# Patient Record
Sex: FEMALE | Race: WHITE | Employment: UNEMPLOYED | URBAN - NONMETROPOLITAN AREA
[De-identification: names, ages, dates, MRNs, and addresses within clinical notes are randomized per-mention and may not be internally consistent; named-entity substitution may affect disease eponyms.]

---

## 2017-12-12 ENCOUNTER — TELEPHONE (OUTPATIENT)
Dept: NEUROLOGY | Age: 32
End: 2017-12-12

## 2020-11-17 ENCOUNTER — OFFICE VISIT (OUTPATIENT)
Dept: INTERNAL MEDICINE CLINIC | Age: 35
End: 2020-11-17
Payer: COMMERCIAL

## 2020-11-17 VITALS
TEMPERATURE: 97.5 F | HEIGHT: 67 IN | BODY MASS INDEX: 33.12 KG/M2 | WEIGHT: 211 LBS | HEART RATE: 82 BPM | SYSTOLIC BLOOD PRESSURE: 135 MMHG | DIASTOLIC BLOOD PRESSURE: 73 MMHG

## 2020-11-17 PROCEDURE — 4004F PT TOBACCO SCREEN RCVD TLK: CPT | Performed by: INTERNAL MEDICINE

## 2020-11-17 PROCEDURE — G8417 CALC BMI ABV UP PARAM F/U: HCPCS | Performed by: INTERNAL MEDICINE

## 2020-11-17 PROCEDURE — G8484 FLU IMMUNIZE NO ADMIN: HCPCS | Performed by: INTERNAL MEDICINE

## 2020-11-17 PROCEDURE — 99204 OFFICE O/P NEW MOD 45 MIN: CPT | Performed by: INTERNAL MEDICINE

## 2020-11-17 PROCEDURE — G8427 DOCREV CUR MEDS BY ELIG CLIN: HCPCS | Performed by: INTERNAL MEDICINE

## 2020-11-17 PROCEDURE — 80305 DRUG TEST PRSMV DIR OPT OBS: CPT | Performed by: INTERNAL MEDICINE

## 2020-11-17 RX ORDER — NALTREXONE HYDROCHLORIDE 50 MG/1
TABLET, FILM COATED ORAL
COMMUNITY
Start: 2020-06-18

## 2020-11-17 RX ORDER — HYDROXYZINE PAMOATE 25 MG/1
CAPSULE ORAL
COMMUNITY
Start: 2020-08-12

## 2020-11-17 RX ORDER — TRAZODONE HYDROCHLORIDE 50 MG/1
TABLET ORAL
COMMUNITY
Start: 2020-08-12

## 2020-11-17 RX ORDER — BUPROPION HYDROCHLORIDE 150 MG/1
TABLET ORAL
COMMUNITY
Start: 2020-08-12

## 2020-11-17 RX ORDER — BUPRENORPHINE AND NALOXONE 4; 1 MG/1; MG/1
1 FILM, SOLUBLE BUCCAL; SUBLINGUAL ONCE
Status: COMPLETED | OUTPATIENT
Start: 2020-11-17 | End: 2020-11-17

## 2020-11-17 RX ADMIN — BUPRENORPHINE AND NALOXONE 1 FILM: 4; 1 FILM, SOLUBLE BUCCAL; SUBLINGUAL at 16:18

## 2020-11-17 NOTE — PROGRESS NOTES
Verbal order per Dr. Rene Mehta for urine drug screen. Negative for all drugs. Verified results with [facility] Patient., LPN. Dr. Rene Mehta ordered Suboxone 4 mg film BID for patient. Verified dose with patient. Patient was given Suboxone 4 mg film qty 1, in office. Patient was sent home with Suboxone 4 mg film BID qty 2 and will be seen back in the office on 11/18/20.

## 2020-11-17 NOTE — PROGRESS NOTES
MEDICATION ASSISTED TREATMENT ENCOUNTER    HISTORY OF PRESENT ILLNESS  Patient presents for evaluation of opioid use and would like to be placed on medication assisted treatment  Patient is referred by a friend  Patient has had problems using heroin  Patient started using [id number] years ago  She started buying pain pills off the street at age 12 then went to heroin  Patient uses it intravenous  Other drugs used: THC  Suboxone programs in the past about 2 years ago [facility] Horn is a put her on Suboxone but her  said she could not take it  Vivitrol in the past through Olu Horn she was on this for a few months a couple years ago  Last use of heroin 2 weeks ago  She used Percocets about a week ago  Patient would typically use more than a gram a day when she was using heavy  Ever used Suboxone off the street no    Past Medical History:   Diagnosis Date    Hepatitis C        Past Surgical History:   Procedure Laterality Date    TUBAL LIGATION  2016       PAST PSYCHIATRIC HISTORY: ADHD      No Known Allergies    Current Outpatient Medications   Medication Sig Dispense Refill    buPROPion (WELLBUTRIN XL) 150 MG extended release tablet buPROPion Wellbutrin  MG Oral Tablet Extended Release 24 Hour 08/12/2020 Provider: 08-  [facility] ([id number])      hydrOXYzine (VISTARIL) 25 MG capsule hydrOXYzine Vistaril 25 MG Oral Capsule 08/12/2020 Provider: 08-  [facility] 87 ([id number])      traZODone (DESYREL) 50 MG tablet traZODone traZODone HCl 50 MG Oral Tablet 08/12/2020 Provider: 08-  [facility] 87 ([id number])      buprenorphine-naloxone (SUBOXONE) 4-1 MG FILM SL film Place 1 Film under the tongue 2 times daily for 5 days.  10 Film 0    naltrexone (DEPADE) 50 MG tablet Naltrexone Naltrexone HCl 50 MG Oral Tablet 08/12/2020 Provider: 08-  [facility] 87 ([id number])       No current facility-administered medications for this visit. 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     Legal issues [employer] term of 2 years multiple alf terms     Tobacco: yes, cigarettes     Alcohol occasional                 ROS     General: Patient denies fevers, chills ,weight changes, sweats     Psych: No depression, anxiety, suicidal ideation or attempts     Endocrine: No thyroid issues,no neck pain, no galactorrhea, no weight changes     Pulmonary: No shortness of breath, orthopnea, PND     Cardiac: No chest pain,syncope, no history of cardiac issues     GI: No trouble with bowels, no abdominal pain     : No dysuria, nocturia, urgency, frequency     MS: Patient denies bone or joint aches, no myalgias     Neuro: Patient denies headaches, seizures, tremors     Skin: No skin lesions, rashes    PHYSICAL EXAM     Blood pressure 135/73, pulse 82, temperature 97.5 °F (36.4 °C), height 5' 7\" (1.702 m), weight 211 lb (95.7 kg).              General: Patient resting comfortably in no acute distress     Mental Status Examination:  Level of consciousness:  within normal limits and awake  Appearance:  well-appearing, in chair, good grooming and good hygiene  Behavior/Motor:  {Normal  Attitude toward examiner:  cooperative and attentive  Speech:  spontaneous and normal volume  Mood: normal  Affect:  appropriate  Thought processes:  linear  Thought content:  Denies homicidal ideations  Suicidal Ideation:  denies suicidal ideation  Delusions:  no evidence of delusions  Perceptual Disturbance:  denies any perceptual disturbance  Cognition:  oriented to person, place, and time    Insight : good  Judgment: good  Medication Side Effects:none       Eyes: Pupils are normal         Skin: No rashes, lesions or abnormalities noted        URINE DRUG SCREEN TODAY:  Recent Labs 11/18/20  1520   ALCOHOL NEG   LABAMPH NEG   LABBARB NEG   LABBENZ NEG   BUPRENUR POS   COCAIMETSCRU NEG   FENTSCRUR NEG   GABAPENTIN N/A   MDMA NEG   METAMPU NEG   LABMETH NEG   OPIATESCREENURINE NEG   OXTCOSU NEG   PHENCYCLIDINESCREENURINE NEG   PROPOXYPHENE N/A   SPICEUR NEG   THCSCREENUR POS   TRAMADOLUR NEG   TRICYUR N/A           Diagnosis Orders   1. Severe opioid use disorder (HCC)  POCT Rapid Drug Screen    buprenorphine-naloxone (SUBOXONE) 4-1 MG SL film 1 Film   2. Encounter for monitoring Suboxone maintenance therapy           PLAN:  Provider provided education on Medication Assisted Treatment options, including: Suboxone, Sublocade, Methadone, and Naltrexone/Vivitrol  Allowed opportunity to respond to questions regarding the treatment options. Patient voices that their treatment preference is suboxone. I feel that this patient is an appropriate candidate for this treatment option. Education given on the importance of combining Medication Assisted Treatment with comprehensive treatment, including: individual counseling, treatment groups, community support groups, and psychiatry as applicable. Patient will meet with  to review clinic counseling expectations and to be linked to appropriate services. Provider reviewed medication contract with patient. Patient is agreeable to the program expectations. Both patient and provider signed the medication contract. Patient instructed to go to [facility] to watch a video and learn about [employer]. I told patient [employer] is an opioid antagonist that reverses respiratory depression caused by opioids. Pharmacy will give patient or family member [employer] and explain how to use in an emergency.   I reviewed the [place] Automated Rx Reporting System report     There does not appear to be any discrepancies or overprescribing of controlled substances    Patient was given 4 mg of Suboxone here and monitored for 30

## 2020-11-18 ENCOUNTER — OFFICE VISIT (OUTPATIENT)
Dept: INTERNAL MEDICINE CLINIC | Age: 35
End: 2020-11-18
Payer: COMMERCIAL

## 2020-11-18 VITALS
WEIGHT: 210 LBS | HEART RATE: 85 BPM | BODY MASS INDEX: 32.96 KG/M2 | SYSTOLIC BLOOD PRESSURE: 123 MMHG | HEIGHT: 67 IN | TEMPERATURE: 97.3 F | DIASTOLIC BLOOD PRESSURE: 67 MMHG

## 2020-11-18 LAB
ALCOHOL URINE: ABNORMAL
AMPHETAMINE SCREEN, URINE: ABNORMAL
BARBITURATE SCREEN, URINE: ABNORMAL
BENZODIAZEPINE SCREEN, URINE: ABNORMAL
BUPRENORPHINE URINE: ABNORMAL
COCAINE METABOLITE SCREEN URINE: ABNORMAL
FENTANYL SCREEN, URINE: ABNORMAL
GABAPENTIN SCREEN, URINE: ABNORMAL
MDMA URINE: ABNORMAL
METHADONE SCREEN, URINE: ABNORMAL
METHAMPHETAMINE, URINE: ABNORMAL
OPIATE SCREEN URINE: ABNORMAL
OXYCODONE SCREEN URINE: ABNORMAL
PHENCYCLIDINE SCREEN URINE: ABNORMAL
PROPOXYPHENE SCREEN, URINE: ABNORMAL
SYNTHETIC CANNABINOIDS(K2) SCREEN, URINE: ABNORMAL
THC SCREEN, URINE: ABNORMAL
TRAMADOL SCREEN URINE: ABNORMAL
TRICYCLIC ANTIDEPRESSANTS, UR: ABNORMAL

## 2020-11-18 PROCEDURE — 80305 DRUG TEST PRSMV DIR OPT OBS: CPT | Performed by: INTERNAL MEDICINE

## 2020-11-18 PROCEDURE — 4004F PT TOBACCO SCREEN RCVD TLK: CPT | Performed by: INTERNAL MEDICINE

## 2020-11-18 PROCEDURE — G8417 CALC BMI ABV UP PARAM F/U: HCPCS | Performed by: INTERNAL MEDICINE

## 2020-11-18 PROCEDURE — 99213 OFFICE O/P EST LOW 20 MIN: CPT | Performed by: INTERNAL MEDICINE

## 2020-11-18 PROCEDURE — G8427 DOCREV CUR MEDS BY ELIG CLIN: HCPCS | Performed by: INTERNAL MEDICINE

## 2020-11-18 PROCEDURE — G8484 FLU IMMUNIZE NO ADMIN: HCPCS | Performed by: INTERNAL MEDICINE

## 2020-11-18 RX ORDER — BUPRENORPHINE AND NALOXONE 4; 1 MG/1; MG/1
1 FILM, SOLUBLE BUCCAL; SUBLINGUAL 2 TIMES DAILY
Qty: 10 FILM | Refills: 0 | Status: SHIPPED | OUTPATIENT
Start: 2020-11-18 | End: 2020-11-23 | Stop reason: SDUPTHER

## 2020-11-18 NOTE — PROGRESS NOTES
MEDICATION ASSISTED TREATMENT ENCOUNTER    HISTORY OF PRESENT ILLNESS  Patient presents for evaluation of opioid use and would like to be placed on medication assisted treatment  Patient is referred by a friend  I saw her here yesterday for the first time (11/17)  We did a Suboxone induction here with 4 mg  I gave her 2 days 4 mg films to go home with  She says she feels fine today    Patient has had problems using heroin  Patient started using [id number] years ago  She started buying pain pills off the street at age 12 then went to heroin  Patient uses it intravenous  Other drugs used: THC          ROS     General: Patient is feeling well  Patient is not experiencing  withdrawal symptoms ,no urges or cravings  Patient is not having any side effects from the buprenorphine      PHYSICAL EXAM     Blood pressure 123/67, pulse 85, temperature 97.3 °F (36.3 °C), height 5' 7\" (1.702 m), weight 210 lb (95.3 kg).              General: Patient resting comfortably in no acute distress     Mental Status Examination:  Level of consciousness:  within normal limits and awake  Appearance:  well-appearing, in chair, good grooming and good hygiene  Behavior/Motor:  {Normal  Attitude toward examiner:  cooperative and attentive  Speech:  spontaneous and normal volume  Mood: normal  Affect:  appropriate  Thought processes:  linear  Thought content:  Denies homicidal ideations  Suicidal Ideation:  denies suicidal ideation  Delusions:  no evidence of delusions  Perceptual Disturbance:  denies any perceptual disturbance  Cognition:  oriented to person, place, and time    Insight : good  Judgment: good  Medication Side Effects:none       Eyes: Pupils are normal         Skin: No rashes, lesions or abnormalities noted        URINE DRUG SCREEN TODAY:  No results for input(s): ALCOHOL, LABAMPH, LABBARB, LABBENZ, BUPRENUR, COCAIMETSCRU, FENTSCRUR, GABAPENTIN, MDMA, METAMPU, LABMETH, OPIATESCREENURINE, OXTCOSU, PHENCYCLIDINESCREENURINE, PROPOXYPHENE, SPICEUR, THCSCREENUR, TRAMADOLUR, TRICYUR in the last 72 hours. Diagnosis Orders   1. Severe opioid use disorder ([facility]  POCT Rapid Drug Screen    YSABEL - Estefani Jones MD, Gastroenterology, [facility] FENG CRAFT [address].VIERTEL    DISCONTINUED: buprenorphine-naloxone (SUBOXONE) 4-1 MG FILM SL film   2. Encounter for monitoring Suboxone maintenance therapy     3. Chronic hepatitis C without hepatic coma ([facility]     4.  Polysubstance abuse ([facility]           PLAN:  Plan to continue Suboxone  She has hepatitis C  We will order baseline labs for treatment  Follow-up here in 5 days  Need to set up counseling and meetings etc.

## 2020-11-18 NOTE — PROGRESS NOTES
Verbal order per Dr. Beryl Ace for urine drug screen. Positive for BUP THC. Verified results with Marshall Urban RN. Dr. Beryl Ace ordered Suboxone 4mg film BID  for patient. Verified dose with patient. Patient was sent home with 5 day script of Subxoone 4mg film daily BID and will be seen back in the office 11/23/20. Ordered Hep C labs.

## 2020-11-23 ENCOUNTER — OFFICE VISIT (OUTPATIENT)
Dept: INTERNAL MEDICINE CLINIC | Age: 35
End: 2020-11-23
Payer: COMMERCIAL

## 2020-11-23 VITALS
HEIGHT: 67 IN | SYSTOLIC BLOOD PRESSURE: 131 MMHG | BODY MASS INDEX: 32.96 KG/M2 | DIASTOLIC BLOOD PRESSURE: 68 MMHG | HEART RATE: 86 BPM | WEIGHT: 210 LBS | TEMPERATURE: 97.4 F

## 2020-11-23 PROCEDURE — 99213 OFFICE O/P EST LOW 20 MIN: CPT | Performed by: INTERNAL MEDICINE

## 2020-11-23 PROCEDURE — G8484 FLU IMMUNIZE NO ADMIN: HCPCS | Performed by: INTERNAL MEDICINE

## 2020-11-23 PROCEDURE — G8427 DOCREV CUR MEDS BY ELIG CLIN: HCPCS | Performed by: INTERNAL MEDICINE

## 2020-11-23 PROCEDURE — G8417 CALC BMI ABV UP PARAM F/U: HCPCS | Performed by: INTERNAL MEDICINE

## 2020-11-23 PROCEDURE — 80305 DRUG TEST PRSMV DIR OPT OBS: CPT | Performed by: INTERNAL MEDICINE

## 2020-11-23 PROCEDURE — 4004F PT TOBACCO SCREEN RCVD TLK: CPT | Performed by: INTERNAL MEDICINE

## 2020-11-23 RX ORDER — BUPRENORPHINE AND NALOXONE 4; 1 MG/1; MG/1
1 FILM, SOLUBLE BUCCAL; SUBLINGUAL 2 TIMES DAILY
Qty: 16 FILM | Refills: 0 | Status: SHIPPED | OUTPATIENT
Start: 2020-11-23 | End: 2020-12-01

## 2020-11-23 NOTE — PROGRESS NOTES
MEDICATION ASSISTED TREATMENT ENCOUNTER    HISTORY OF PRESENT ILLNESS  Patient presents for evaluation of opioid use and would like to be placed on medication assisted treatment  Patient is referred by a friend  I saw her here  for the first time (11/17)  I then saw her 11/18  We did a Suboxone induction here with 4 mg  I gave her 2 days 4 mg films to go home with  She says she feels fine today    Patient has had problems using heroin  Patient started using [id number] years ago  She started buying pain pills off the street at age 12 then went to heroin  Patient uses it intravenous  Other drugs used: THC          ROS     General: Patient is feeling well  Patient is not experiencing  withdrawal symptoms ,no urges or cravings  Patient is not having any side effects from the buprenorphine      PHYSICAL EXAM     Blood pressure 131/68, pulse 86, temperature 97.4 °F (36.3 °C), height 5' 7\" (1.702 m), weight 210 lb (95.3 kg).              General: Patient resting comfortably in no acute distress     Mental Status Examination:  Level of consciousness:  within normal limits and awake  Appearance:  well-appearing, in chair, good grooming and good hygiene  Behavior/Motor:  {Normal  Attitude toward examiner:  cooperative and attentive  Speech:  spontaneous and normal volume  Mood: normal  Affect:  appropriate  Thought processes:  linear  Thought content:  Denies homicidal ideations  Suicidal Ideation:  denies suicidal ideation  Delusions:  no evidence of delusions  Perceptual Disturbance:  denies any perceptual disturbance  Cognition:  oriented to person, place, and time    Insight : good  Judgment: good  Medication Side Effects:none       Eyes: Pupils are normal         Skin: No rashes, lesions or abnormalities noted        URINE DRUG SCREEN TODAY:  Recent Labs     11/23/20  1607   ALCOHOL NEG   LABAMPH NEG   LABBARB NEG   LABBENZ NEG   [id number] NEG   FENTSCRUR NEG   GABAPENTIN N/A   MDMA NEG   METAMPU NEG   LABMETH NEG   OPIATESCREENURINE NEG   OXTCOSU NEG   PHENCYCLIDINESCREENURINE NEG   PROPOXYPHENE N/A   SPICEUR NEG   THCSCREENUR POS   TRAMADOLUR NEG   TRICYUR N/A           Diagnosis Orders   1. Severe opioid use disorder (HCC)  POCT Rapid Drug Screen    buprenorphine-naloxone (SUBOXONE) 4-1 MG FILM SL film   2. Hepatitis C virus carrier state ([facility]     3. Encounter for monitoring Suboxone maintenance therapy     4.  Polysubstance abuse ([facility]           PLAN:  She is going through a break-up with a man after 4 years  It is stressful  She had some thoughts about using but she did not  Follow-up here 8 days  I reviewed the [place] Automated Rx Reporting System report     There does not appear to be any discrepancies or overprescribing of controlled substances

## 2020-12-01 ENCOUNTER — VIRTUAL VISIT (OUTPATIENT)
Dept: INTERNAL MEDICINE CLINIC | Age: 35
End: 2020-12-01
Payer: COMMERCIAL

## 2020-12-01 PROCEDURE — G8484 FLU IMMUNIZE NO ADMIN: HCPCS | Performed by: INTERNAL MEDICINE

## 2020-12-01 PROCEDURE — 99213 OFFICE O/P EST LOW 20 MIN: CPT | Performed by: INTERNAL MEDICINE

## 2020-12-01 PROCEDURE — G8417 CALC BMI ABV UP PARAM F/U: HCPCS | Performed by: INTERNAL MEDICINE

## 2020-12-01 PROCEDURE — G8427 DOCREV CUR MEDS BY ELIG CLIN: HCPCS | Performed by: INTERNAL MEDICINE

## 2020-12-01 PROCEDURE — 4004F PT TOBACCO SCREEN RCVD TLK: CPT | Performed by: INTERNAL MEDICINE

## 2020-12-01 RX ORDER — BUPRENORPHINE AND NALOXONE 4; 1 MG/1; MG/1
1 FILM, SOLUBLE BUCCAL; SUBLINGUAL 2 TIMES DAILY
Qty: 16 FILM | Refills: 0 | Status: CANCELLED | OUTPATIENT
Start: 2020-12-01 | End: 2020-12-09

## 2020-12-01 RX ORDER — BUPRENORPHINE AND NALOXONE 12; 3 MG/1; MG/1
1 FILM, SOLUBLE BUCCAL; SUBLINGUAL DAILY
Qty: 7 FILM | Refills: 0 | Status: SHIPPED | OUTPATIENT
Start: 2020-12-01 | End: 2020-12-11 | Stop reason: SDUPTHER

## 2020-12-01 NOTE — PROGRESS NOTES
2020    TELEHEALTH EVALUATION -- Audio/Visual (During JQHEE-47 public health emergency)    HPI:  A video conference was done with the patient  Patient was at home, I was in the office  There was no  one else  present during the conference    Leticia Smith (:  1985) has requested an audio/video evaluation for the following concern(s):    HPI  I saw her here  for the first time ()  I then saw her   We did a Suboxone induction here with 4 mg  I gave her 2 days 4 mg films to go home with  She says she feels fine today    Patient has had problems using heroin  Patient started using [id number] years ago  She started buying pain pills off the street at age 12 then went to heroin  Patient uses it intravenous  Other drugs used: THC    ROS-patient is still having some cravings not every day but she is having some  She has not acted on them    Prior to Visit Medications    Medication Sig Taking? Authorizing Provider   buprenorphine-naloxone (SUBOXONE) 4-1 MG FILM SL film Place 1 Film under the tongue 2 times daily for 8 days.  Yes Yael Alejandra MD   buPROPion (WELLBUTRIN XL) 150 MG extended release tablet buPROPion Wellbutrin  MG Oral Tablet Extended Release 24 Hour 2020 Provider: 2020  [place] [address] ([id number]) Yes Historical Provider, MD   hydrOXYzine (VISTARIL) 25 MG capsule hydrOXYzine Vistaril 25 MG Oral Capsule 2020 Provider: 2020  [place] [address] ([id number]) Yes Historical Provider, MD   traZODone (DESYREL) 50 MG tablet traZODone traZODone HCl 50 MG Oral Tablet 2020 Provider: 2020  [place] [address] ([id number]) Yes Historical Provider, MD   naltrexone (DEPADE) 50 MG tablet Naltrexone Naltrexone HCl 50 MG Oral Tablet 2020 Provider: 2020  [place] [address] ([id number]) Yes Historical Provider, MD       Social History     Tobacco Use    Smoking status: Current Every Day Smoker     Packs/day: 0.50 Types: Cigarettes    Smokeless tobacco: Former User   Substance Use Topics    Alcohol use: No    Drug use: Not Currently     Types: Marijuana, IV, Opiates      Comment: last used perc 30 1 week ago 11/10/20. last used heroin 2 weeks ago 11/3/20            PHYSICAL EXAMINATION:  [ INSTRUCTIONS:  \"[x]\" Indicates a positive item  \"[]\" Indicates a negative item  -- DELETE ALL ITEMS NOT EXAMINED]  No vitals done    Constitutional: [x] Appears well-developed and well-nourished [x] No apparent distress      [] Abnormal-   Mental status  [x] Alert and awake  [x] Oriented to person/place/time [x]Able to follow commands      Eyes:  EOM    [x]  Normal  [] Abnormal-  Sclera  []  Normal  [] Abnormal -         Discharge []  None visible  [] Abnormal -  Pupils normal           Psychiatric:       [x] Normal Affect [] No Hallucinations        [] Abnormal-        Diagnosis Orders   1. Severe opioid use disorder (HCC)  buprenorphine-naloxone (SUBOXONE) 4-1 MG FILM SL film         Terese Olszewski is a 29 y.o. female being evaluated by a Virtual Visit (video visit) encounter to address concerns as mentioned above. A caregiver was present when appropriate. Due to this being a TeleHealth encounter (During [facility]- public health emergency), evaluation of the following organ systems was limited: Vitals/Constitutional/EENT/Resp/CV/GI//MS/Neuro/Skin/Heme-Lymph-Imm. Pursuant to the emergency declaration under the [facility], [address] [place] [address] and the [employer] Act, this Virtual Visit was conducted with patient's (and/or legal guardian's) consent, to reduce the patient's risk of exposure to COVID-19 and provide necessary medical care. The patient (and/or legal guardian) has also been advised to contact this office for worsening conditions or problems, and seek emergency medical treatment and/or call 911 if deemed necessary.      Services were provided through a video synchronous discussion virtually to substitute for in-person clinic visit. --Bryan Mcconnell MD on 12/1/2020 at 4:11 PM    An electronic signature was used to authenticate this note.

## 2020-12-11 ENCOUNTER — OFFICE VISIT (OUTPATIENT)
Dept: INTERNAL MEDICINE CLINIC | Age: 35
End: 2020-12-11
Payer: COMMERCIAL

## 2020-12-11 VITALS
RESPIRATION RATE: 12 BRPM | HEART RATE: 87 BPM | SYSTOLIC BLOOD PRESSURE: 136 MMHG | TEMPERATURE: 97.4 F | HEIGHT: 67 IN | BODY MASS INDEX: 32.08 KG/M2 | WEIGHT: 204.4 LBS | DIASTOLIC BLOOD PRESSURE: 82 MMHG

## 2020-12-11 PROCEDURE — G8417 CALC BMI ABV UP PARAM F/U: HCPCS | Performed by: INTERNAL MEDICINE

## 2020-12-11 PROCEDURE — 80305 DRUG TEST PRSMV DIR OPT OBS: CPT | Performed by: INTERNAL MEDICINE

## 2020-12-11 PROCEDURE — G8484 FLU IMMUNIZE NO ADMIN: HCPCS | Performed by: INTERNAL MEDICINE

## 2020-12-11 PROCEDURE — 4004F PT TOBACCO SCREEN RCVD TLK: CPT | Performed by: INTERNAL MEDICINE

## 2020-12-11 PROCEDURE — G8427 DOCREV CUR MEDS BY ELIG CLIN: HCPCS | Performed by: INTERNAL MEDICINE

## 2020-12-11 PROCEDURE — 99213 OFFICE O/P EST LOW 20 MIN: CPT | Performed by: INTERNAL MEDICINE

## 2020-12-11 RX ORDER — BUPRENORPHINE AND NALOXONE 12; 3 MG/1; MG/1
0.5 FILM, SOLUBLE BUCCAL; SUBLINGUAL 2 TIMES DAILY
Qty: 3 FILM | Refills: 0 | Status: SHIPPED | OUTPATIENT
Start: 2020-12-11 | End: 2020-12-14 | Stop reason: SDUPTHER

## 2020-12-11 ASSESSMENT — PATIENT HEALTH QUESTIONNAIRE - PHQ9
SUM OF ALL RESPONSES TO PHQ QUESTIONS 1-9: 0
2. FEELING DOWN, DEPRESSED OR HOPELESS: 0
SUM OF ALL RESPONSES TO PHQ9 QUESTIONS 1 & 2: 0
1. LITTLE INTEREST OR PLEASURE IN DOING THINGS: 0
SUM OF ALL RESPONSES TO PHQ QUESTIONS 1-9: 0
SUM OF ALL RESPONSES TO PHQ QUESTIONS 1-9: 0

## 2020-12-11 NOTE — PROGRESS NOTES
tablet traZODone traZODone HCl 50 MG Oral Tablet 08/12/2020 Provider: 08-  [facility] ([id number]) Yes Historical Provider, MD   naltrexone (DEPADE) 50 MG tablet Naltrexone Naltrexone HCl 50 MG Oral Tablet 08/12/2020 Provider: 08-  [facility] 87 ([id number]) Yes Historical Provider, MD        Social History     Tobacco Use    Smoking status: Current Every Day Smoker     Packs/day: 0.50     Types: Cigarettes    Smokeless tobacco: Former User   Substance Use Topics    Alcohol use: No        Vitals:    12/11/20 1100   BP: 136/82   Site: Right Upper Arm   Position: Sitting   Cuff Size: Large Adult   Pulse: 87   Resp: 12   Temp: 97.4 °F (36.3 °C)   TempSrc: Temporal   Weight: 204 lb 6.4 oz (92.7 kg)   Height: 5' 7\" (1.702 m)     Estimated body mass index is 32.01 kg/m² as calculated from the following:    Height as of this encounter: 5' 7\" (1.702 m). Weight as of this encounter: 204 lb 6.4 oz (92.7 kg). Physical Exam     She is alert and oriented. Urine is +ve for Amphetamine, Bup, Methamphetamine,   THC     ASSESSMENT/PLAN:    Chronic opiate disorder  - with last intake of meth and others   Yesterday, she was advised to stay clean so that she can   Continue the suboxone, 12 mg film  1/2 BID x 4 days   And advised her to closely follow up with counseling and   Our office on Monday 12/14. Suboxone  Scripts sent to the pharmacy today. Return in about 3 days (around 12/14/2020). and follow up   With Dr. Parris Domínguez at this office. An electronic signature was used to authenticate this note.     --Yissel De La Rosa MD on 12/11/2020 at 6:28 PM

## 2020-12-14 ENCOUNTER — VIRTUAL VISIT (OUTPATIENT)
Dept: INTERNAL MEDICINE CLINIC | Age: 35
End: 2020-12-14
Payer: COMMERCIAL

## 2020-12-14 PROCEDURE — G8484 FLU IMMUNIZE NO ADMIN: HCPCS | Performed by: INTERNAL MEDICINE

## 2020-12-14 PROCEDURE — 4004F PT TOBACCO SCREEN RCVD TLK: CPT | Performed by: INTERNAL MEDICINE

## 2020-12-14 PROCEDURE — G8417 CALC BMI ABV UP PARAM F/U: HCPCS | Performed by: INTERNAL MEDICINE

## 2020-12-14 PROCEDURE — 99213 OFFICE O/P EST LOW 20 MIN: CPT | Performed by: INTERNAL MEDICINE

## 2020-12-14 PROCEDURE — G8428 CUR MEDS NOT DOCUMENT: HCPCS | Performed by: INTERNAL MEDICINE

## 2020-12-14 RX ORDER — BUPRENORPHINE AND NALOXONE 12; 3 MG/1; MG/1
1 FILM, SOLUBLE BUCCAL; SUBLINGUAL DAILY
Qty: 4 FILM | Refills: 0 | Status: SHIPPED | OUTPATIENT
Start: 2020-12-14 | End: 2020-12-17 | Stop reason: SDUPTHER

## 2020-12-14 NOTE — PROGRESS NOTES
Heron Morris is a 29 y.o. female evaluated via telephone on 12/14/2020. Consent:  She and/or health care decision maker is aware that that she may receive a bill for this telephone service, depending on her insurance coverage, and has provided verbal consent to proceed: Yes    We tried a video visit today but it would not work so we made  It a phone visit  Documentation:  I last saw her 11/23  She saw Dr. Leeanne Burt 12/11  Patient is referred by a friend  I saw her here  for the first time (11/17)    We did a Suboxone induction here with 4 mg  I gave her 2 days 4 mg films to go home with  She says she feels fine today    Patient has had problems using heroin  Patient started using [id number] years ago  She started buying pain pills off the street at age 12 then went to heroin  Patient uses it intravenous  Other drugs used: THC    I affirm this is a Patient Initiated Episode with a Patient who has not had a related appointment within my department in the past 7 days or scheduled within the next 24 hours. Diagnosis Orders   1. Severe opioid use disorder (HCC)  buprenorphine-naloxone (SUBOXONE) 12-3 MG sublingual film   2. Encounter for monitoring Suboxone maintenance therapy     3. Polysubstance abuse ([facility]     4.  Chronic hepatitis C without hepatic coma ([facility]     Patient was supposed to come in today however she had to take her child to the doctor  I will give her enough medicine to last until Thursday  She will follow-up here in the office at that time    Patient identification was verified at the start of the visit: Yes    Total Time: minutes: 5-10 minutes    Note: not billable if this call serves to triage the patient into an appointment for the relevant concern      Jane Marvin

## 2020-12-17 ENCOUNTER — VIRTUAL VISIT (OUTPATIENT)
Dept: INTERNAL MEDICINE CLINIC | Age: 35
End: 2020-12-17
Payer: COMMERCIAL

## 2020-12-17 PROCEDURE — 4004F PT TOBACCO SCREEN RCVD TLK: CPT | Performed by: INTERNAL MEDICINE

## 2020-12-17 PROCEDURE — G8417 CALC BMI ABV UP PARAM F/U: HCPCS | Performed by: INTERNAL MEDICINE

## 2020-12-17 PROCEDURE — 99213 OFFICE O/P EST LOW 20 MIN: CPT | Performed by: INTERNAL MEDICINE

## 2020-12-17 PROCEDURE — G8428 CUR MEDS NOT DOCUMENT: HCPCS | Performed by: INTERNAL MEDICINE

## 2020-12-17 PROCEDURE — G8484 FLU IMMUNIZE NO ADMIN: HCPCS | Performed by: INTERNAL MEDICINE

## 2020-12-17 RX ORDER — BUPRENORPHINE AND NALOXONE 12; 3 MG/1; MG/1
1 FILM, SOLUBLE BUCCAL; SUBLINGUAL DAILY
Qty: 12 FILM | Refills: 0 | Status: SHIPPED | OUTPATIENT
Start: 2020-12-17 | End: 2020-12-29

## 2020-12-17 NOTE — PROGRESS NOTES
2020    TELEHEALTH EVALUATION -- Audio/Visual (During [place]-15 public health emergency)    HPI:  A video conference was done with the patient  Patient was at home, I was in the office  There was no  one else  present during the conference    Rosemary Meade (:  1985) has requested an audio/video evaluation for the following concern(s):    HPI  I saw her here  for the first time ()  I then saw her   We did a virtual visit   We did a Suboxone induction here with 4 mg  I gave her 2 days 4 mg films to go home with  She is under a lot of stress boyfriend left her    Patient has had problems using heroin  Patient started using [id number] years ago  She started buying pain pills off the street at age 12 then went to heroin  Patient uses it intravenous  Other drugs used: THC  ROS-Patient is feeling well  Patient is not experiencing  withdrawal symptoms ,no urges or cravings  Patient is not having any side effects from the buprenorphine      Prior to Visit Medications    Medication Sig Taking? Authorizing Provider   buprenorphine-naloxone (SUBOXONE) 12-3 MG sublingual film Place 1 Film under the tongue daily for 12 days.  Yes Shiloh Trinidad MD   buPROPion (WELLBUTRIN XL) 150 MG extended release tablet buPROPion Wellbutrin  MG Oral Tablet Extended Release 24 Hour 2020 Provider: 2020  [facility] 87 ([id number])  Historical Provider, MD   hydrOXYzine (VISTARIL) 25 MG capsule hydrOXYzine Vistaril 25 MG Oral Capsule 2020 Provider: 2020  [facility] 87 ([id number])  Historical Provider, MD   traZODone (DESYREL) 50 MG tablet traZODone traZODone HCl 50 MG Oral Tablet 2020 Provider: 2020  [facility] 87 ([id number])  Historical Provider, MD   naltrexone (DEPADE) 50 MG tablet Naltrexone Naltrexone HCl 50 MG Oral Tablet 2020 Provider: 2020  [facility] 87 ([id number])  Historical Provider, MD Social History     Tobacco Use    Smoking status: Current Every Day Smoker     Packs/day: 0.50     Types: Cigarettes    Smokeless tobacco: Former User   Substance Use Topics    Alcohol use: No    Drug use: Not Currently     Types: Marijuana, IV, Opiates      Comment: last used perc 30 1 week ago 11/10/20. last used heroin 2 weeks ago 11/3/20            PHYSICAL EXAMINATION:  [ INSTRUCTIONS:  \"[x]\" Indicates a positive item  \"[]\" Indicates a negative item  -- DELETE ALL ITEMS NOT EXAMINED]  No vitals done    Constitutional: [x] Appears well-developed and well-nourished [x] No apparent distress      [] Abnormal-   Mental status  [x] Alert and awake  [x] Oriented to person/place/time [x]Able to follow commands      Eyes:  EOM    [x]  Normal  [] Abnormal-  Sclera  []  Normal  [] Abnormal -         Discharge []  None visible  [] Abnormal -  Pupils normal           Psychiatric:       [x] Normal Affect [] No Hallucinations        [] Abnormal-        Diagnosis Orders   1. Severe opioid use disorder (HCC)  buprenorphine-naloxone (SUBOXONE) 12-3 MG sublingual film   2. Encounter for monitoring Suboxone maintenance therapy     3. Polysubstance abuse ([facility]     4.  Chronic hepatitis C without hepatic coma (HCC) Rina Connor is a 29 y.o. female being evaluated by a Virtual Visit (video visit) encounter to address concerns as mentioned above. A caregiver was present when appropriate. Due to this being a TeleHealth encounter (During [facility] public health emergency), evaluation of the following organ systems was limited: Vitals/Constitutional/EENT/Resp/CV/GI//MS/Neuro/Skin/Heme-Lymph-Imm. Pursuant to the emergency declaration under the [address] [place] [address] and the [employer] Act, this Virtual Visit was conducted with patient's (and/or legal guardian's) consent, to reduce the patient's risk of exposure to COVID-19 and provide necessary medical care. The patient (and/or legal guardian) has also been advised to contact this office for worsening conditions or problems, and seek emergency medical treatment and/or call 911 if deemed necessary. Services were provided through a video synchronous discussion virtually to substitute for in-person clinic visit. Patient doing well  Continue current Suboxone dose  Follow-up 12 days  --Valentine Hoyt MD on 12/19/2020 at 10:59 AM    An electronic signature was used to authenticate this note.

## 2020-12-31 RX ORDER — BUPRENORPHINE AND NALOXONE 12; 3 MG/1; MG/1
1 FILM, SOLUBLE BUCCAL; SUBLINGUAL DAILY
Qty: 5 FILM | Refills: 0 | OUTPATIENT
Start: 2020-12-31 | End: 2021-01-05 | Stop reason: SDUPTHER

## 2021-01-05 ENCOUNTER — NURSE ONLY (OUTPATIENT)
Dept: LAB | Age: 36
End: 2021-01-05

## 2021-01-05 ENCOUNTER — OFFICE VISIT (OUTPATIENT)
Dept: INTERNAL MEDICINE CLINIC | Age: 36
End: 2021-01-05
Payer: COMMERCIAL

## 2021-01-05 VITALS
HEART RATE: 107 BPM | BODY MASS INDEX: 31.71 KG/M2 | DIASTOLIC BLOOD PRESSURE: 77 MMHG | SYSTOLIC BLOOD PRESSURE: 126 MMHG | HEIGHT: 67 IN | TEMPERATURE: 97.7 F | WEIGHT: 202 LBS

## 2021-01-05 DIAGNOSIS — Z51.81 ENCOUNTER FOR MONITORING SUBOXONE MAINTENANCE THERAPY: ICD-10-CM

## 2021-01-05 DIAGNOSIS — F11.20 SEVERE OPIOID USE DISORDER (HCC): Primary | ICD-10-CM

## 2021-01-05 DIAGNOSIS — F11.20 SEVERE OPIOID USE DISORDER (HCC): ICD-10-CM

## 2021-01-05 DIAGNOSIS — B18.2 CHRONIC HEPATITIS C WITHOUT HEPATIC COMA (HCC): ICD-10-CM

## 2021-01-05 DIAGNOSIS — Z79.899 ENCOUNTER FOR MONITORING SUBOXONE MAINTENANCE THERAPY: ICD-10-CM

## 2021-01-05 DIAGNOSIS — F19.10 POLYSUBSTANCE ABUSE (HCC): ICD-10-CM

## 2021-01-05 PROCEDURE — 80305 DRUG TEST PRSMV DIR OPT OBS: CPT | Performed by: INTERNAL MEDICINE

## 2021-01-05 PROCEDURE — G8484 FLU IMMUNIZE NO ADMIN: HCPCS | Performed by: INTERNAL MEDICINE

## 2021-01-05 PROCEDURE — G8417 CALC BMI ABV UP PARAM F/U: HCPCS | Performed by: INTERNAL MEDICINE

## 2021-01-05 PROCEDURE — 4004F PT TOBACCO SCREEN RCVD TLK: CPT | Performed by: INTERNAL MEDICINE

## 2021-01-05 PROCEDURE — 99213 OFFICE O/P EST LOW 20 MIN: CPT | Performed by: INTERNAL MEDICINE

## 2021-01-05 PROCEDURE — G8427 DOCREV CUR MEDS BY ELIG CLIN: HCPCS | Performed by: INTERNAL MEDICINE

## 2021-01-05 RX ORDER — BUPRENORPHINE AND NALOXONE 12; 3 MG/1; MG/1
1 FILM, SOLUBLE BUCCAL; SUBLINGUAL DAILY
Qty: 10 FILM | Refills: 0 | Status: SHIPPED | OUTPATIENT
Start: 2021-01-05 | End: 2021-01-14 | Stop reason: SDUPTHER

## 2021-01-05 NOTE — PROGRESS NOTES
Verbal order per Dr. Lucero Rosa for urine drug screen. Positive for BUP, THC. Verified results with Brian Benavidez LPN. Dr. Lucero Rosa ordered Suboxone 12 mg film daily for patient. Verified dose with patient. Patient was sent home with 10 day script for Suboxone 12 mg film daily and will be seen back in the office on 1/14/20.

## 2021-01-05 NOTE — PROGRESS NOTES
MEDICATION ASSISTED TREATMENT ENCOUNTER    HISTORY OF PRESENT ILLNESS  Patient presents for evaluation of opioid use and would like to be placed on medication assisted treatment  Patient is referred by a friend  I saw her here  for the first time (11/17)  Last visit here was 12/11 and we have done 2 video visits since    Last November we did a Suboxone induction here with 4 mg  I gave her 2 days 4 mg films to go home with    He now takes 12 mg Suboxone daily  Patient has had problems using heroin  Patient started using [id number] years ago  She started buying pain pills off the street at age 12 then went to heroin  Patient uses it intravenous  Other drugs used: THC          ROS     General: Patient is feeling well  Patient is not experiencing  withdrawal symptoms ,no urges or cravings  Patient is not having any side effects from the buprenorphine      PHYSICAL EXAM     Blood pressure 126/77, pulse 107, temperature 97.7 °F (36.5 °C), height 5' 7\" (1.702 m), weight 202 lb (91.6 kg).              General: Patient resting comfortably in no acute distress     Mental Status Examination:  Level of consciousness:  within normal limits and awake  Appearance:  well-appearing, in chair, good grooming and good hygiene  Behavior/Motor:  {Normal  Attitude toward examiner:  cooperative and attentive  Speech:  spontaneous and normal volume  Mood: normal  Affect:  appropriate  Thought processes:  linear  Thought content:  Denies homicidal ideations  Suicidal Ideation:  denies suicidal ideation  Delusions:  no evidence of delusions  Perceptual Disturbance:  denies any perceptual disturbance  Cognition:  oriented to person, place, and time    Insight : good  Judgment: good  Medication Side Effects:none       Eyes: Pupils are normal         Skin: No rashes, lesions or abnormalities noted        URINE DRUG SCREEN TODAY:  Recent Labs     01/05/21  1418   ALCOHOL NEG LABAMPH NEG   LABBARB NEG   LABBENZ NEG   BUPRENUR POS   COCAIMETSCRU NEG   FENTSCRUR NEG   GABAPENTIN N/A   MDMA NEG   METAMPU NEG   LABMETH NEG   OPIATESCREENURINE NEG   OXTCOSU NEG   PHENCYCLIDINESCREENURINE NEG   PROPOXYPHENE N/A   SPICEUR NEG   THCSCREENUR POS   TRAMADOLUR NEG   TRICYUR N/A           Diagnosis Orders   1. Severe opioid use disorder (HCC)  POCT Rapid Drug Screen    buprenorphine-naloxone (SUBOXONE) 12-3 MG sublingual film    HCV Ultra Quant (Viral Load)   2. Encounter for monitoring Suboxone maintenance therapy     3. Polysubstance abuse ([facility] [address]     4.  Chronic hepatitis C without hepatic coma (HCC)           PLAN:  She is going through a break-up with a man after 4 years  It is stressful  She had some thoughts about using but she did not  Follow-up here 10 days  I reviewed the [place] Automated [facility] Reporting System report     There does not appear to be any discrepancies or overprescribing of controlled substances  Check viral load

## 2021-01-08 LAB
HCV QNT BY NAAT INTERPRETATION: DETECTED
HCV QNT BY NAAT IU/ML: ABNORMAL
HCV QNT BY NAAT LOG IU/ML: 6.19 LOG IU/ML

## 2021-01-14 ENCOUNTER — OFFICE VISIT (OUTPATIENT)
Dept: INTERNAL MEDICINE CLINIC | Age: 36
End: 2021-01-14
Payer: COMMERCIAL

## 2021-01-14 VITALS
WEIGHT: 202 LBS | HEIGHT: 67 IN | SYSTOLIC BLOOD PRESSURE: 105 MMHG | DIASTOLIC BLOOD PRESSURE: 72 MMHG | BODY MASS INDEX: 31.71 KG/M2 | HEART RATE: 89 BPM

## 2021-01-14 DIAGNOSIS — Z51.81 ENCOUNTER FOR MONITORING SUBOXONE MAINTENANCE THERAPY: ICD-10-CM

## 2021-01-14 DIAGNOSIS — F19.10 POLYSUBSTANCE ABUSE (HCC): ICD-10-CM

## 2021-01-14 DIAGNOSIS — F11.20 SEVERE OPIOID USE DISORDER (HCC): Primary | ICD-10-CM

## 2021-01-14 DIAGNOSIS — Z79.899 ENCOUNTER FOR MONITORING SUBOXONE MAINTENANCE THERAPY: ICD-10-CM

## 2021-01-14 DIAGNOSIS — B19.20 HEPATITIS C VIRUS INFECTION WITHOUT HEPATIC COMA, UNSPECIFIED CHRONICITY: ICD-10-CM

## 2021-01-14 DIAGNOSIS — B18.2 CHRONIC HEPATITIS C WITHOUT HEPATIC COMA (HCC): ICD-10-CM

## 2021-01-14 PROCEDURE — G8427 DOCREV CUR MEDS BY ELIG CLIN: HCPCS | Performed by: INTERNAL MEDICINE

## 2021-01-14 PROCEDURE — 4004F PT TOBACCO SCREEN RCVD TLK: CPT | Performed by: INTERNAL MEDICINE

## 2021-01-14 PROCEDURE — 99213 OFFICE O/P EST LOW 20 MIN: CPT | Performed by: INTERNAL MEDICINE

## 2021-01-14 PROCEDURE — G8417 CALC BMI ABV UP PARAM F/U: HCPCS | Performed by: INTERNAL MEDICINE

## 2021-01-14 PROCEDURE — G8484 FLU IMMUNIZE NO ADMIN: HCPCS | Performed by: INTERNAL MEDICINE

## 2021-01-14 PROCEDURE — 80305 DRUG TEST PRSMV DIR OPT OBS: CPT | Performed by: INTERNAL MEDICINE

## 2021-01-14 RX ORDER — BUPRENORPHINE AND NALOXONE 12; 3 MG/1; MG/1
1 FILM, SOLUBLE BUCCAL; SUBLINGUAL DAILY
Qty: 11 FILM | Refills: 0 | Status: SHIPPED | OUTPATIENT
Start: 2021-01-14 | End: 2021-01-25 | Stop reason: SDUPTHER

## 2021-01-14 NOTE — PROGRESS NOTES
MEDICATION ASSISTED TREATMENT ENCOUNTER    HISTORY OF PRESENT ILLNESS  Patient presents for evaluation of opioid use and would like to be placed on medication assisted treatment  Patient is referred by a friend  I saw her here  for the first time (11/17)  Last visit here was 1/5      Last November we did a Suboxone induction here with 4 mg  I gave her 2 days 4 mg films to go home with    He now takes 12 mg Suboxone daily  Patient has had problems using heroin  Patient started using [id number] years ago  She started buying pain pills off the street at age 12 then went to heroin  Patient uses it intravenous  Other drugs used: THC          ROS     General: Patient is feeling well  Patient is not experiencing  withdrawal symptoms ,no urges or cravings  Patient is not having any side effects from the buprenorphine      PHYSICAL EXAM     Blood pressure 105/72, pulse 89, height 5' 7\" (1.702 m), weight 202 lb (91.6 kg).              General: Patient resting comfortably in no acute distress     Mental Status Examination:  Level of consciousness:  within normal limits and awake  Appearance:  well-appearing, in chair, good grooming and good hygiene  Behavior/Motor:  {Normal  Attitude toward examiner:  cooperative and attentive  Speech:  spontaneous and normal volume  Mood: normal  Affect:  appropriate  Thought processes:  linear  Thought content:  Denies homicidal ideations  Suicidal Ideation:  denies suicidal ideation  Delusions:  no evidence of delusions  Perceptual Disturbance:  denies any perceptual disturbance  Cognition:  oriented to person, place, and time    Insight : good  Judgment: good  Medication Side Effects:none       Eyes: Pupils are normal         Skin: No rashes, lesions or abnormalities noted        URINE DRUG SCREEN TODAY:  Recent Labs     01/14/21  1545   ALCOHOL NEG   LABAMPH NEG   LABBARB NEG   LABBENZ NEG   BUPRENUR POS   COCAIMETSCRU NEG FENTSCRUR NEG   GABAPENTIN N/A   MDMA NEG   METAMPU NEG   LABMETH NEG   OPIATESCREENURINE NEG   OXTCOSU POS   PHENCYCLIDINESCREENURINE NEG   PROPOXYPHENE N/A   SPICEUR NEG   THCSCREENUR POS   TRAMADOLUR NEG   TRICYUR N/A           Diagnosis Orders   1. Severe opioid use disorder (HCC)  POCT Rapid Drug Screen    buprenorphine-naloxone (SUBOXONE) 12-3 MG sublingual film   2. Hepatitis C virus infection without hepatic coma, unspecified chronicity  Alpha-1-Antitrypsin    DAVID Screen with Reflex    CBC    Ceruloplasmin    Comprehensive Metabolic Panel    Ferritin    HCV Quant reflex to Genotype    Hepatitis A Antibody, Total    US LESION ELASTOGRAPHY    RPR Reflex to Titer and TPPA    Protime-INR    Mitochondrial antibodies, M2    Iron    HIV Screen    Hepatitis B Surface Antigen    Hepatitis B Core Antibody, IgM   3. Encounter for monitoring Suboxone maintenance therapy     4. Polysubstance abuse ([facility] [address]     5.  Chronic hepatitis C without hepatic coma (HCC)           PLAN:  She is going through a break-up with a man after 4 years  It is stressful  She had some thoughts about using but she did not  Follow-up here 11 days  I reviewed the [place] Autom[facility] Reporting [facility] report     There does not appear to be any discrepancies or overprescribing of controlled substances  I order the required testing for the treatment of hep C

## 2021-01-14 NOTE — PROGRESS NOTES
Verbal order per Dr. Ulysses Morrell for urine drug screen. Positive for BUP, OXY, THC. Verified results with Ilir Urias LPN. Dr. Ulysses Morrell ordered Suboxone 12 mg film daily for patient. Verified dose with patient. Patient was sent home with 11 day script for Suboxone 12 mg film daily and will be seen back in the office on 1/25/21.

## 2021-01-25 ENCOUNTER — OFFICE VISIT (OUTPATIENT)
Dept: INTERNAL MEDICINE CLINIC | Age: 36
End: 2021-01-25
Payer: COMMERCIAL

## 2021-01-25 VITALS
SYSTOLIC BLOOD PRESSURE: 86 MMHG | DIASTOLIC BLOOD PRESSURE: 60 MMHG | WEIGHT: 198 LBS | HEART RATE: 79 BPM | BODY MASS INDEX: 31.08 KG/M2 | HEIGHT: 67 IN

## 2021-01-25 DIAGNOSIS — Z79.899 ENCOUNTER FOR MONITORING SUBOXONE MAINTENANCE THERAPY: ICD-10-CM

## 2021-01-25 DIAGNOSIS — F11.20 SEVERE OPIOID USE DISORDER (HCC): Primary | ICD-10-CM

## 2021-01-25 DIAGNOSIS — Z51.81 ENCOUNTER FOR MONITORING SUBOXONE MAINTENANCE THERAPY: ICD-10-CM

## 2021-01-25 DIAGNOSIS — B19.20 HEPATITIS C VIRUS INFECTION WITHOUT HEPATIC COMA, UNSPECIFIED CHRONICITY: ICD-10-CM

## 2021-01-25 DIAGNOSIS — F19.10 POLYSUBSTANCE ABUSE (HCC): ICD-10-CM

## 2021-01-25 PROCEDURE — G8484 FLU IMMUNIZE NO ADMIN: HCPCS | Performed by: INTERNAL MEDICINE

## 2021-01-25 PROCEDURE — G8417 CALC BMI ABV UP PARAM F/U: HCPCS | Performed by: INTERNAL MEDICINE

## 2021-01-25 PROCEDURE — 99213 OFFICE O/P EST LOW 20 MIN: CPT | Performed by: INTERNAL MEDICINE

## 2021-01-25 PROCEDURE — G8427 DOCREV CUR MEDS BY ELIG CLIN: HCPCS | Performed by: INTERNAL MEDICINE

## 2021-01-25 PROCEDURE — 4004F PT TOBACCO SCREEN RCVD TLK: CPT | Performed by: INTERNAL MEDICINE

## 2021-01-25 PROCEDURE — 80305 DRUG TEST PRSMV DIR OPT OBS: CPT | Performed by: INTERNAL MEDICINE

## 2021-01-25 RX ORDER — BUPRENORPHINE AND NALOXONE 12; 3 MG/1; MG/1
1 FILM, SOLUBLE BUCCAL; SUBLINGUAL DAILY
Qty: 4 FILM | Refills: 0 | Status: SHIPPED | OUTPATIENT
Start: 2021-01-25 | End: 2021-01-28 | Stop reason: SDUPTHER

## 2021-01-25 NOTE — PROGRESS NOTES
Verbal order per Dr. Ed Donohue for urine drug screen. Positive for BUP OXY THC. Verified results with Agustin Carias RN. Dr. Ed Donohue ordered Suboxone 12mg film daily  for patient. Verified dose with patient. Patient was sent home with 4 day script of Suboxone 12mg film daily and will be seen back in the office 1/28/21.

## 2021-01-25 NOTE — PROGRESS NOTES
MEDICATION ASSISTED TREATMENT ENCOUNTER    HISTORY OF PRESENT ILLNESS  Patient presents for evaluation of opioid use and would like to be placed on medication assisted treatment  Patient is referred by a friend  I saw her here  for the first time (11/17)  Last visit here was 1/14      Last November we did a Suboxone induction here with 4 mg  I gave her 2 days 4 mg films to go home with    He now takes 12 mg Suboxone daily  Patient has had problems using heroin  Patient started using [id number] years ago  She started buying pain pills off the street at age 12 then went to heroin  Patient uses it intravenous  Other drugs used: THC    She says she has pain in her back she took pain pills off the street  days ago      ROS     General: Patient is feeling well  Patient is not experiencing  withdrawal symptoms ,no urges or cravings  Patient is not having any side effects from the buprenorphine      PHYSICAL EXAM     Blood pressure 86/60, pulse 79, height 5' 7\" (1.702 m), weight 198 lb (89.8 kg).              General: Patient resting comfortably in no acute distress     Mental Status Examination:  Level of consciousness:  within normal limits and awake  Appearance:  well-appearing, in chair, good grooming and good hygiene  Behavior/Motor:  {Normal  Attitude toward examiner:  cooperative and attentive  Speech:  spontaneous and normal volume  Mood: normal  Affect:  appropriate  Thought processes:  linear  Thought content:  Denies homicidal ideations  Suicidal Ideation:  denies suicidal ideation  Delusions:  no evidence of delusions  Perceptual Disturbance:  denies any perceptual disturbance  Cognition:  oriented to person, place, and time    Insight : good  Judgment: good  Medication Side Effects:none       Eyes: Pupils are normal         Skin: No rashes, lesions or abnormalities noted        URINE DRUG SCREEN TODAY:  Recent Labs     01/28/21  1318   ALCOHOL NEG LABAMPH NEG   LABBARB NEG   LABBENZ NEG   BUPRENUR POS   COCAIMETSCRU NEG   FENTSCRUR NEG   GABAPENTIN N/A   MDMA NEG   METAMPU NEG   LABMETH NEG   OPIATESCREENURINE NEG   OXTCOSU NEG   PHENCYCLIDINESCREENURINE N/A   PROPOXYPHENE NEG   SPICEUR NEG   THCSCREENUR POS   TRAMADOLUR NEG   TRICYUR N/A           Diagnosis Orders   1. Severe opioid use disorder (HCC)  POCT Rapid Drug Screen    DISCONTINUED: buprenorphine-naloxone (SUBOXONE) 12-3 MG sublingual film   2. Hepatitis C virus infection without hepatic coma, unspecified chronicity     3. Encounter for monitoring Suboxone maintenance therapy     4.  Polysubstance abuse ([facility] [address]           PLAN:  She is going through a break-up with a man after 4 years  It is stressful  Told her she cannot be buying pain pills off the street  Follow-up here 2 days  I reviewed the [place] Autom[facility] Reporting [facility] report     There does not appear to be any discrepancies or overprescribing of controlled substances  I order the required testing for the treatment of hep C last visit

## 2021-01-28 ENCOUNTER — OFFICE VISIT (OUTPATIENT)
Dept: INTERNAL MEDICINE CLINIC | Age: 36
End: 2021-01-28
Payer: COMMERCIAL

## 2021-01-28 DIAGNOSIS — Z79.899 ENCOUNTER FOR MONITORING SUBOXONE MAINTENANCE THERAPY: ICD-10-CM

## 2021-01-28 DIAGNOSIS — B19.20 HEPATITIS C VIRUS INFECTION WITHOUT HEPATIC COMA, UNSPECIFIED CHRONICITY: ICD-10-CM

## 2021-01-28 DIAGNOSIS — Z51.81 ENCOUNTER FOR MONITORING SUBOXONE MAINTENANCE THERAPY: ICD-10-CM

## 2021-01-28 DIAGNOSIS — F11.20 SEVERE OPIOID USE DISORDER (HCC): Primary | ICD-10-CM

## 2021-01-28 DIAGNOSIS — F19.10 POLYSUBSTANCE ABUSE (HCC): ICD-10-CM

## 2021-01-28 PROCEDURE — 80305 DRUG TEST PRSMV DIR OPT OBS: CPT | Performed by: INTERNAL MEDICINE

## 2021-01-28 PROCEDURE — G8484 FLU IMMUNIZE NO ADMIN: HCPCS | Performed by: INTERNAL MEDICINE

## 2021-01-28 PROCEDURE — G8417 CALC BMI ABV UP PARAM F/U: HCPCS | Performed by: INTERNAL MEDICINE

## 2021-01-28 PROCEDURE — 99213 OFFICE O/P EST LOW 20 MIN: CPT | Performed by: INTERNAL MEDICINE

## 2021-01-28 PROCEDURE — G8427 DOCREV CUR MEDS BY ELIG CLIN: HCPCS | Performed by: INTERNAL MEDICINE

## 2021-01-28 PROCEDURE — 4004F PT TOBACCO SCREEN RCVD TLK: CPT | Performed by: INTERNAL MEDICINE

## 2021-01-28 RX ORDER — BUPRENORPHINE AND NALOXONE 12; 3 MG/1; MG/1
1 FILM, SOLUBLE BUCCAL; SUBLINGUAL DAILY
Qty: 14 FILM | Refills: 0 | Status: SHIPPED | OUTPATIENT
Start: 2021-01-28 | End: 2021-02-11 | Stop reason: SDUPTHER

## 2021-01-28 NOTE — PROGRESS NOTES
Per Verbal order of for urine drug screen. Patient is positive for  BUP,THC  . Verified with results with Iggy Thomas LPN.

## 2021-01-28 NOTE — PROGRESS NOTES
MEDICATION ASSISTED TREATMENT ENCOUNTER    HISTORY OF PRESENT ILLNESS  Patient presents for evaluation of opioid use and would like to be placed on medication assisted treatment  Patient is referred by a friend  I saw her here  for the first time (11/17)  Last visit here was 1/25      Last November we did a Suboxone induction here with 4 mg  I gave her 2 days 4 mg films to go home with    He now takes 12 mg Suboxone daily  Patient has had problems using heroin  Patient started using [id number] years ago  She started buying pain pills off the street at age 12 then went to heroin  Patient uses it intravenous  Other drugs used: THC    She says she has pain in her back she took pain pills off the street 4 days ago      ROS     General: Patient is feeling well  Patient is not experiencing  withdrawal symptoms ,no urges or cravings  Patient is not having any side effects from the buprenorphine      PHYSICAL EXAM     There were no vitals taken for this visit.              General: Patient resting comfortably in no acute distress     Mental Status Examination:  Level of consciousness:  within normal limits and awake  Appearance:  well-appearing, in chair, good grooming and good hygiene  Behavior/Motor:  {Normal  Attitude toward examiner:  cooperative and attentive  Speech:  spontaneous and normal volume  Mood: normal  Affect:  appropriate  Thought processes:  linear  Thought content:  Denies homicidal ideations  Suicidal Ideation:  denies suicidal ideation  Delusions:  no evidence of delusions  Perceptual Disturbance:  denies any perceptual disturbance  Cognition:  oriented to person, place, and time    Insight : poor  Judgment: poor  Medication Side Effects:none       Eyes: Pupils are normal         Skin: No rashes, lesions or abnormalities noted        URINE DRUG SCREEN TODAY:     Alcohol, Urine 01/28/2021  1:18 PM Unknown   NEG Amphetamine Screen, Urine 01/28/2021  1:18 PM Unknown   NEG    Barbiturate Screen, Urine 01/28/2021  1:18 PM Unknown   NEG    Benzodiazepine Screen, Urine 01/28/2021  1:18 PM Unknown   NEG    Buprenorphine Urine 01/28/2021  1:18 PM Unknown   POS    Cocaine Metabolite Screen, Urine 01/28/2021  1:18 PM Unknown   NEG    FENTANYL SCREEN, URINE 01/28/2021  1:18 PM Unknown   NEG    Gabapentin Screen, Urine 01/28/2021  1:18 PM Unknown   N/A    MDMA, Urine 01/28/2021  1:18 PM Unknown   NEG    Methadone Screen, Urine 01/28/2021  1:18 PM Unknown   NEG    Methamphetamine, Urine 01/28/2021  1:18 PM Unknown   NEG    Opiate Scrn, Ur 01/28/2021  1:18 PM Unknown   NEG    Oxycodone Screen, Ur 01/28/2021  1:18 PM Unknown   NEG    PCP Screen, Urine 01/28/2021  1:18 PM Unknown   N/A    Propoxyphene Screen, Urine 01/28/2021  1:18 PM Unknown   NEG    Synthetic Cannabinoids (K2) Screen, Urine 01/28/2021  1:18 PM Unknown   NEG    THC Screen, Urine 01/28/2021  1:18 PM Unknown   POS    Tramadol Scrn, Ur 01/28/2021  1:18 PM Unknown   NEG    Tricyclic Antidepressants, Urine 01/28/2021  1:18 PM Unknown   N/A           Diagnosis Orders   1. Severe opioid use disorder (HCC)  POCT Rapid Drug Screen    buprenorphine-naloxone (SUBOXONE) 12-3 MG sublingual film   2. Hepatitis C virus infection without hepatic coma, unspecified chronicity     3. Encounter for monitoring Suboxone maintenance therapy     4.  Polysubstance abuse ([facility] [address]           PLAN:  She is going through a break-up with a man after 4 years  It is stressful  Urine is clean today  I told her it is not okay just to take pain pills when she feels like it  Follow-up here 14 days  I reviewed the [place] Automated [facility] Reporting System report     There does not appear to be any discrepancies or overprescribing of controlled substances  I order the required testing for the treatment of hep C

## 2021-02-11 ENCOUNTER — OFFICE VISIT (OUTPATIENT)
Dept: INTERNAL MEDICINE CLINIC | Age: 36
End: 2021-02-11
Payer: COMMERCIAL

## 2021-02-11 VITALS
BODY MASS INDEX: 31.08 KG/M2 | TEMPERATURE: 97.9 F | WEIGHT: 198 LBS | DIASTOLIC BLOOD PRESSURE: 65 MMHG | HEART RATE: 85 BPM | SYSTOLIC BLOOD PRESSURE: 105 MMHG | HEIGHT: 67 IN

## 2021-02-11 DIAGNOSIS — F11.20 SEVERE OPIOID USE DISORDER (HCC): Primary | ICD-10-CM

## 2021-02-11 DIAGNOSIS — F19.10 POLYSUBSTANCE ABUSE (HCC): ICD-10-CM

## 2021-02-11 DIAGNOSIS — B19.20 HEPATITIS C VIRUS INFECTION WITHOUT HEPATIC COMA, UNSPECIFIED CHRONICITY: ICD-10-CM

## 2021-02-11 DIAGNOSIS — Z51.81 ENCOUNTER FOR MONITORING SUBOXONE MAINTENANCE THERAPY: ICD-10-CM

## 2021-02-11 DIAGNOSIS — Z79.899 ENCOUNTER FOR MONITORING SUBOXONE MAINTENANCE THERAPY: ICD-10-CM

## 2021-02-11 PROCEDURE — 80305 DRUG TEST PRSMV DIR OPT OBS: CPT | Performed by: INTERNAL MEDICINE

## 2021-02-11 PROCEDURE — G8484 FLU IMMUNIZE NO ADMIN: HCPCS | Performed by: INTERNAL MEDICINE

## 2021-02-11 PROCEDURE — G8427 DOCREV CUR MEDS BY ELIG CLIN: HCPCS | Performed by: INTERNAL MEDICINE

## 2021-02-11 PROCEDURE — G8417 CALC BMI ABV UP PARAM F/U: HCPCS | Performed by: INTERNAL MEDICINE

## 2021-02-11 PROCEDURE — 4004F PT TOBACCO SCREEN RCVD TLK: CPT | Performed by: INTERNAL MEDICINE

## 2021-02-11 PROCEDURE — 99213 OFFICE O/P EST LOW 20 MIN: CPT | Performed by: INTERNAL MEDICINE

## 2021-02-11 RX ORDER — BUPRENORPHINE AND NALOXONE 12; 3 MG/1; MG/1
1 FILM, SOLUBLE BUCCAL; SUBLINGUAL DAILY
Qty: 14 FILM | Refills: 0 | Status: SHIPPED | OUTPATIENT
Start: 2021-02-11 | End: 2021-03-16 | Stop reason: SDUPTHER

## 2021-02-11 NOTE — PROGRESS NOTES
MEDICATION ASSISTED TREATMENT ENCOUNTER    HISTORY OF PRESENT ILLNESS  Patient presents for evaluation of opioid use and would like to be placed on medication assisted treatment  Patient is referred by a friend  I saw her here  for the first time (11/17)  Last visit here was 1/28      Last November we did a Suboxone induction here with 4 mg  I gave her 2 days 4 mg films to go home with    He now takes 12 mg Suboxone daily  Patient has had problems using heroin  Patient started using [id number] years ago  She started buying pain pills off the street at age 12 then went to heroin  Patient uses it intravenous  Other drugs used: THC    She says her belly does not feel well  Feels like a bubbly got like she is hungry but eating does not make it go away    ROS     General: Patient is feeling well  Patient is not experiencing  withdrawal symptoms ,no urges or cravings  Patient is not having any side effects from the buprenorphine      PHYSICAL EXAM     Blood pressure 105/65, pulse 85, temperature 97.9 °F (36.6 °C), height 5' 7\" (1.702 m), weight 198 lb (89.8 kg).              General: Patient resting comfortably in no acute distress     Mental Status Examination:  Level of consciousness:  within normal limits and awake  Appearance:  well-appearing, in chair, good grooming and good hygiene  Behavior/Motor:  {Normal  Attitude toward examiner:  cooperative and attentive  Speech:  spontaneous and normal volume  Mood: normal  Affect:  appropriate  Thought processes:  linear  Thought content:  Denies homicidal ideations  Suicidal Ideation:  denies suicidal ideation  Delusions:  no evidence of delusions  Perceptual Disturbance:  denies any perceptual disturbance  Cognition:  oriented to person, place, and time    Insight : poor  Judgment: poor  Medication Side Effects:none       Eyes: Pupils are normal         Skin: No rashes, lesions or abnormalities noted URINE DRUG SCREEN TODAY:     Alcohol, Urine 02/11/2021 12:50 PM Unknown   NEG    Amphetamine Screen, Urine 02/11/2021 12:50 PM Unknown   NEG    Barbiturate Screen, Urine 02/11/2021 12:50 PM Unknown   NEG    Benzodiazepine Screen, Urine 02/11/2021 12:50 PM Unknown   NEG    Buprenorphine Urine 02/11/2021 12:50 PM Unknown   POS    Cocaine Metabolite Screen, Urine 02/11/2021 12:50 PM Unknown   NEG    FENTANYL SCREEN, URINE 02/11/2021 12:50 PM Unknown   NEG    Gabapentin Screen, Urine 02/11/2021 12:50 PM Unknown   N/A    MDMA, Urine 02/11/2021 12:50 PM Unknown   NEG    Methadone Screen, Urine 02/11/2021 12:50 PM Unknown   NEG    Methamphetamine, Urine 02/11/2021 12:50 PM Unknown   NEG    Opiate Scrn, Ur 02/11/2021 12:50 PM Unknown   NEG    Oxycodone Screen, Ur 02/11/2021 12:50 PM Unknown   NEG    PCP Screen, Urine 02/11/2021 12:50 PM Unknown   NEG    Propoxyphene Screen, Urine 02/11/2021 12:50 PM Unknown   N/A    Synthetic Cannabinoids (K2) Screen, Urine 02/11/2021 12:50 PM Unknown   NEG    THC Screen, Urine 02/11/2021 12:50 PM Unknown   POS    Tramadol Scrn, Ur 02/11/2021 12:50 PM Unknown   NEG    Tricyclic Antidepressants, Urine 02/11/2021 12:50 PM Unknown   N/A         Diagnosis Orders   1. Severe opioid use disorder (HCC)  POCT Rapid Drug Screen    buprenorphine-naloxone (SUBOXONE) 12-3 MG sublingual film   2. Hepatitis C virus infection without hepatic coma, unspecified chronicity     3. Polysubstance abuse ([facility] [address]     4.  Encounter for monitoring Suboxone maintenance therapy           PLAN:  She is going through a break-up with a man after 4 years  It is stressful  Urine is clean today  I told her it is not okay just to take pain pills when she feels like it  Follow-up here 14 days  I reviewed the [place] Autom[facility] Reporting [facility] report     There does not appear to be any discrepancies or overprescribing of controlled substances  I order the required testing for the treatment of hep C  She missed her ultrasound we will need to reschedule

## 2021-02-11 NOTE — PROGRESS NOTES
Verbal order per Dr. Tito Park for urine drug screen. Positive for BUP THC. Verified results with Niru Luciano RN. Dr. Tito Park ordered Suboxone 12mg film daily  for patient. Verified dose with patient. Patient was sent home with 2 week script of Suboxone 12mg film daily and will be seen back in the office 2/25/21.

## 2021-03-15 ENCOUNTER — OFFICE VISIT (OUTPATIENT)
Dept: INTERNAL MEDICINE CLINIC | Age: 36
End: 2021-03-15
Payer: COMMERCIAL

## 2021-03-15 DIAGNOSIS — Z51.81 ENCOUNTER FOR MONITORING SUBOXONE MAINTENANCE THERAPY: ICD-10-CM

## 2021-03-15 DIAGNOSIS — F19.10 POLYSUBSTANCE ABUSE (HCC): ICD-10-CM

## 2021-03-15 DIAGNOSIS — F11.20 SEVERE OPIOID USE DISORDER (HCC): Primary | ICD-10-CM

## 2021-03-15 DIAGNOSIS — B19.20 HEPATITIS C VIRUS INFECTION WITHOUT HEPATIC COMA, UNSPECIFIED CHRONICITY: ICD-10-CM

## 2021-03-15 DIAGNOSIS — Z79.899 ENCOUNTER FOR MONITORING SUBOXONE MAINTENANCE THERAPY: ICD-10-CM

## 2021-03-15 PROCEDURE — G8417 CALC BMI ABV UP PARAM F/U: HCPCS | Performed by: INTERNAL MEDICINE

## 2021-03-15 PROCEDURE — 4004F PT TOBACCO SCREEN RCVD TLK: CPT | Performed by: INTERNAL MEDICINE

## 2021-03-15 PROCEDURE — G8484 FLU IMMUNIZE NO ADMIN: HCPCS | Performed by: INTERNAL MEDICINE

## 2021-03-15 PROCEDURE — 99213 OFFICE O/P EST LOW 20 MIN: CPT | Performed by: INTERNAL MEDICINE

## 2021-03-15 PROCEDURE — 80305 DRUG TEST PRSMV DIR OPT OBS: CPT | Performed by: INTERNAL MEDICINE

## 2021-03-15 PROCEDURE — G8427 DOCREV CUR MEDS BY ELIG CLIN: HCPCS | Performed by: INTERNAL MEDICINE

## 2021-03-15 RX ORDER — BUPRENORPHINE AND NALOXONE 12; 3 MG/1; MG/1
1 FILM, SOLUBLE BUCCAL; SUBLINGUAL DAILY
Qty: 2 FILM | Refills: 0 | Status: CANCELLED | OUTPATIENT
Start: 2021-03-15 | End: 2021-03-17
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
## 2021-03-16 ENCOUNTER — OFFICE VISIT (OUTPATIENT)
Dept: INTERNAL MEDICINE CLINIC | Age: 36
End: 2021-03-16
Payer: COMMERCIAL

## 2021-03-16 VITALS
BODY MASS INDEX: 31.08 KG/M2 | WEIGHT: 198 LBS | SYSTOLIC BLOOD PRESSURE: 102 MMHG | HEART RATE: 83 BPM | TEMPERATURE: 98 F | HEIGHT: 67 IN | DIASTOLIC BLOOD PRESSURE: 66 MMHG

## 2021-03-16 DIAGNOSIS — F11.20 SEVERE OPIOID USE DISORDER (HCC): Primary | ICD-10-CM

## 2021-03-16 DIAGNOSIS — Z51.81 ENCOUNTER FOR MONITORING SUBOXONE MAINTENANCE THERAPY: ICD-10-CM

## 2021-03-16 DIAGNOSIS — B19.20 HEPATITIS C VIRUS INFECTION WITHOUT HEPATIC COMA, UNSPECIFIED CHRONICITY: ICD-10-CM

## 2021-03-16 DIAGNOSIS — Z79.899 ENCOUNTER FOR MONITORING SUBOXONE MAINTENANCE THERAPY: ICD-10-CM

## 2021-03-16 DIAGNOSIS — F19.10 POLYSUBSTANCE ABUSE (HCC): ICD-10-CM

## 2021-03-16 PROCEDURE — 4004F PT TOBACCO SCREEN RCVD TLK: CPT | Performed by: INTERNAL MEDICINE

## 2021-03-16 PROCEDURE — G8427 DOCREV CUR MEDS BY ELIG CLIN: HCPCS | Performed by: INTERNAL MEDICINE

## 2021-03-16 PROCEDURE — 99213 OFFICE O/P EST LOW 20 MIN: CPT | Performed by: INTERNAL MEDICINE

## 2021-03-16 PROCEDURE — G8417 CALC BMI ABV UP PARAM F/U: HCPCS | Performed by: INTERNAL MEDICINE

## 2021-03-16 PROCEDURE — G8484 FLU IMMUNIZE NO ADMIN: HCPCS | Performed by: INTERNAL MEDICINE

## 2021-03-16 PROCEDURE — 80305 DRUG TEST PRSMV DIR OPT OBS: CPT | Performed by: INTERNAL MEDICINE

## 2021-03-16 RX ORDER — BUPRENORPHINE AND NALOXONE 12; 3 MG/1; MG/1
1 FILM, SOLUBLE BUCCAL; SUBLINGUAL DAILY
Qty: 2 FILM | Refills: 0 | Status: SHIPPED | OUTPATIENT
Start: 2021-03-16 | End: 2021-03-19 | Stop reason: SDUPTHER
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
## 2021-03-19 ENCOUNTER — OFFICE VISIT (OUTPATIENT)
Dept: INTERNAL MEDICINE CLINIC | Age: 36
End: 2021-03-19
Payer: COMMERCIAL

## 2021-03-19 VITALS
HEIGHT: 67 IN | WEIGHT: 191 LBS | HEART RATE: 93 BPM | BODY MASS INDEX: 29.98 KG/M2 | TEMPERATURE: 98.2 F | SYSTOLIC BLOOD PRESSURE: 130 MMHG | DIASTOLIC BLOOD PRESSURE: 82 MMHG

## 2021-03-19 DIAGNOSIS — F11.20 SEVERE OPIOID USE DISORDER (HCC): Primary | ICD-10-CM

## 2021-03-19 PROCEDURE — 4004F PT TOBACCO SCREEN RCVD TLK: CPT | Performed by: NURSE PRACTITIONER

## 2021-03-19 PROCEDURE — G8417 CALC BMI ABV UP PARAM F/U: HCPCS | Performed by: NURSE PRACTITIONER

## 2021-03-19 PROCEDURE — G8484 FLU IMMUNIZE NO ADMIN: HCPCS | Performed by: NURSE PRACTITIONER

## 2021-03-19 PROCEDURE — 99213 OFFICE O/P EST LOW 20 MIN: CPT | Performed by: NURSE PRACTITIONER

## 2021-03-19 PROCEDURE — G8427 DOCREV CUR MEDS BY ELIG CLIN: HCPCS | Performed by: NURSE PRACTITIONER

## 2021-03-19 PROCEDURE — 80305 DRUG TEST PRSMV DIR OPT OBS: CPT | Performed by: NURSE PRACTITIONER

## 2021-03-19 RX ORDER — BUPRENORPHINE AND NALOXONE 12; 3 MG/1; MG/1
1 FILM, SOLUBLE BUCCAL; SUBLINGUAL DAILY
COMMUNITY

## 2021-03-19 RX ORDER — BUPRENORPHINE AND NALOXONE 12; 3 MG/1; MG/1
1 FILM, SOLUBLE BUCCAL; SUBLINGUAL DAILY
Qty: 6 FILM | Refills: 0 | Status: SHIPPED | OUTPATIENT
Start: 2021-03-19 | End: 2021-03-25

## 2021-03-19 NOTE — PROGRESS NOTES
Verbal order per Pili Florian CNP for urine drug screen. Positive for BUP. Verified results with Melissa Carter RN.    Pili Florian CNP ordered Suboxone 12mg film daily  for patient. Verified dose with patient. Patient was sent home with 6 day script of Suboxone 12mg film daily and will be seen back in the office 3/25/21.

## 2021-03-19 NOTE — PROGRESS NOTES
[facility]NAGEMENT  Bhakti Schmidt  Dept: [phone]  Dept Fax: [phone]: [phone]     Visit Date:  3/19/2021    Patient:  Ish Balderrama  YOB: 1985    HPI:     Chief Complaint   Patient presents with   Jaimejulián Lopez 20 presents today for medical evaluation of severe opioid use disorder. MAT patient of Dr. Juana Ansari. Last seen him 3 days ago. Urine positive for buprenorphine only. Urges and cravings controlled with Suboxone 12 mg daily. Is active in counseling. Medications    Current Outpatient Medications:     buprenorphine-naloxone (SUBOXONE) 12-3 MG sublingual film, Place 1 Film under the tongue daily. , Disp: , Rfl:     buPROPion (WELLBUTRIN XL) 150 MG extended release tablet, buPROPion Wellbutrin  MG Oral Tablet Extended Release 24 Hour 08/12/2020 Provider: 08-  [place] [address] ([id number]), Disp: , Rfl:     hydrOXYzine (VISTARIL) 25 MG capsule, hydrOXYzine Vistaril 25 MG Oral Capsule 08/12/2020 Provider: 08-  [place] [address] ([id number]), Disp: , Rfl:     traZODone (DESYREL) 50 MG tablet, traZODone traZODone HCl 50 MG Oral Tablet 08/12/2020 Provider: 08-  [place] [address] ([id number]), Disp: , Rfl:     naltrexone (DEPADE) 50 MG tablet, Naltrexone Naltrexone HCl 50 MG Oral Tablet 08/12/2020 Provider: 08-  [place] [address] ([id number]), Disp: , Rfl:     The patient has No Known Allergies. Past Medical History  Jose Trinidad  has a past medical history of Hepatitis C. Past Surgical History  The patient  has a past surgical history that includes Tubal ligation (2016). Family History  This patient's family history is not on file. Social History  Jose Trinidad  reports that she has been smoking cigarettes. She has been smoking about 0.50 packs per day.  She has Vitals signs reviewed. Constitutional:       General: She is not in acute distress. Appearance: Normal appearance. She is not ill-appearing. HENT:      Head: Normocephalic and atraumatic. Right Ear: External ear normal.      Left Ear: External ear normal.      Nose: Nose normal. No congestion or rhinorrhea. Mouth/Throat:      Mouth: Mucous membranes are moist.   Eyes:      Extraocular Movements: Extraocular movements intact. Conjunctiva/sclera: Conjunctivae normal.      Pupils: Pupils are equal, round, and reactive to light. Neck:      Musculoskeletal: Normal range of motion and neck supple. Pulmonary:      Effort: Pulmonary effort is normal. No respiratory distress. Musculoskeletal: Normal range of motion. Right lower leg: No edema. Left lower leg: No edema. Skin:     General: Skin is warm and dry. Neurological:      General: No focal deficit present. Mental Status: She is alert and oriented to person, place, and time. Psychiatric:         Mood and Affect: Mood normal.         Behavior: Behavior normal.         Thought Content: Thought content normal.         Judgment: Judgment normal.         Labs Reviewed 3/19/2021:    No results found for: WBC, HGB, HCT, PLT, CHOL, TRIG, HDL, LDLDIRECT, ALT, AST, NA, K, CL, CREATININE, BUN, CO2, TSH, PSA, INR, GLUF, LABA1C, LABMICR    Assessment/Plan      1. Severe opioid use disorder (HCC)    - POCT Rapid Drug Screen  - buprenorphine-naloxone (SUBOXONE) 12-3 MG sublingual film; Place 1 Film under the tongue daily for 6 days. Dispense: 6 Film; Refill: 0  - OARRS reviewed, no discrepancies  - Narcan at home  - Continue counseling as scheduled      Return in about 6 days (around 3/25/2021) for with RAW. Patient given educational materials - see patient instructions. Discussed use, benefit, and side effects of prescribed medications. All patient questions answered. Pt voiced understanding. Reviewed health maintenance. Electronically signed JENNY Baker CNP on 3/19/21 at 10:49 AM EDT

## 2021-03-25 ENCOUNTER — OFFICE VISIT (OUTPATIENT)
Dept: INTERNAL MEDICINE CLINIC | Age: 36
End: 2021-03-25
Payer: COMMERCIAL

## 2021-03-25 VITALS
BODY MASS INDEX: 29.98 KG/M2 | HEIGHT: 67 IN | WEIGHT: 191 LBS | TEMPERATURE: 97.8 F | DIASTOLIC BLOOD PRESSURE: 82 MMHG | SYSTOLIC BLOOD PRESSURE: 124 MMHG | HEART RATE: 101 BPM

## 2021-03-25 DIAGNOSIS — F11.20 SEVERE OPIOID USE DISORDER (HCC): Primary | ICD-10-CM

## 2021-03-25 PROCEDURE — 99024 POSTOP FOLLOW-UP VISIT: CPT | Performed by: INTERNAL MEDICINE

## 2021-03-25 PROCEDURE — 80305 DRUG TEST PRSMV DIR OPT OBS: CPT | Performed by: INTERNAL MEDICINE

## 2021-03-25 RX ORDER — BUPRENORPHINE AND NALOXONE 12; 3 MG/1; MG/1
1 FILM, SOLUBLE BUCCAL; SUBLINGUAL DAILY
Qty: 6 FILM | Refills: 0 | Status: CANCELLED | OUTPATIENT
Start: 2021-03-25 | End: 2021-03-31

## 2021-04-11 ASSESSMENT — ENCOUNTER SYMPTOMS
COUGH: 0
SINUS PRESSURE: 0
WHEEZING: 0
ABDOMINAL PAIN: 0
DIARRHEA: 0
RHINORRHEA: 0
PHOTOPHOBIA: 0
SINUS PAIN: 0
VOMITING: 0
NAUSEA: 0
TROUBLE SWALLOWING: 0
SORE THROAT: 0
ABDOMINAL DISTENTION: 0
SHORTNESS OF BREATH: 0
BLOOD IN STOOL: 0
CONSTIPATION: 0

## 2022-11-10 ENCOUNTER — HOSPITAL ENCOUNTER (OUTPATIENT)
Age: 37
Setting detail: SPECIMEN
Discharge: HOME | End: 2022-11-10

## 2022-11-10 LAB
ABSOLUTE EOS #: 0.1 K/UL (ref 0–0.44)
ABSOLUTE IMMATURE GRANULOCYTE: <0.03 K/UL (ref 0–0.3)
ABSOLUTE LYMPH #: 2.44 K/UL (ref 1.1–3.7)
ABSOLUTE MONO #: 0.66 K/UL (ref 0.1–1.2)
ALBUMIN SERPL-MCNC: 4.4 G/DL (ref 3.5–5.2)
ALBUMIN/GLOBULIN RATIO: 1.8 (ref 1–2.5)
ALP BLD-CCNC: 56 U/L (ref 35–104)
ALT SERPL-CCNC: 23 U/L (ref 5–33)
ANION GAP SERPL CALCULATED.3IONS-SCNC: 13 MMOL/L (ref 9–17)
ANTIBODY SCREEN: NEGATIVE
AST SERPL-CCNC: 20 U/L
BASOPHILS # BLD: 1 % (ref 0–2)
BASOPHILS ABSOLUTE: 0.04 K/UL (ref 0–0.2)
BILIRUB SERPL-MCNC: 0.5 MG/DL (ref 0.3–1.2)
BUN BLDV-MCNC: 8 MG/DL (ref 6–20)
CALCIUM SERPL-MCNC: 9.3 MG/DL (ref 8.6–10.4)
CHLORIDE BLD-SCNC: 100 MMOL/L (ref 98–107)
CHOLESTEROL, FASTING: 171 MG/DL
CHOLESTEROL/HDL RATIO: 3
CO2: 27 MMOL/L (ref 20–31)
CREAT SERPL-MCNC: 0.55 MG/DL (ref 0.5–0.9)
EOSINOPHILS RELATIVE PERCENT: 1 % (ref 1–4)
GFR SERPL CREATININE-BSD FRML MDRD: >60 ML/MIN/1.73M2
GLUCOSE BLD-MCNC: 83 MG/DL (ref 70–99)
HAV IGM SER IA-ACNC: NONREACTIVE
HCG QUALITATIVE: NEGATIVE
HCT VFR BLD CALC: 46.2 % (ref 36.3–47.1)
HDLC SERPL-MCNC: 57 MG/DL
HEMOGLOBIN: 15.3 G/DL (ref 11.9–15.1)
HEPATITIS B CORE IGM ANTIBODY: NONREACTIVE
HEPATITIS B SURFACE ANTIGEN: NONREACTIVE
HEPATITIS C ANTIBODY: REACTIVE
HIV AG/AB: NONREACTIVE
IMMATURE GRANULOCYTES: 0 %
LDL CHOLESTEROL: 93 MG/DL (ref 0–130)
LYMPHOCYTES # BLD: 29 % (ref 24–43)
MCH RBC QN AUTO: 34 PG (ref 25.2–33.5)
MCHC RBC AUTO-ENTMCNC: 33.1 G/DL (ref 28.4–34.8)
MCV RBC AUTO: 102.7 FL (ref 82.6–102.9)
MONOCYTES # BLD: 8 % (ref 3–12)
NRBC AUTOMATED: 0 PER 100 WBC
PDW BLD-RTO: 12.5 % (ref 11.8–14.4)
PLATELET # BLD: 267 K/UL (ref 138–453)
PMV BLD AUTO: 10 FL (ref 8.1–13.5)
POTASSIUM SERPL-SCNC: 3.8 MMOL/L (ref 3.7–5.3)
RBC # BLD: 4.5 M/UL (ref 3.95–5.11)
SEDIMENTATION RATE, ERYTHROCYTE: 6 MM/HR (ref 0–20)
SEG NEUTROPHILS: 61 % (ref 36–65)
SEGMENTED NEUTROPHILS ABSOLUTE COUNT: 5.16 K/UL (ref 1.5–8.1)
SODIUM BLD-SCNC: 140 MMOL/L (ref 135–144)
T. PALLIDUM, IGG: NONREACTIVE
TOTAL PROTEIN: 6.8 G/DL (ref 6.4–8.3)
TRIGLYCERIDE, FASTING: 104 MG/DL
TSH SERPL DL<=0.05 MIU/L-ACNC: 2.48 UIU/ML (ref 0.3–5)
WBC # BLD: 8.4 K/UL (ref 3.5–11.3)

## 2022-11-12 LAB — HEPATITIS BE ANTIBODY: NEGATIVE

## 2022-11-14 ENCOUNTER — HOSPITAL ENCOUNTER (OUTPATIENT)
Age: 37
Setting detail: SPECIMEN
Discharge: HOME | End: 2022-11-14

## 2022-11-14 LAB
INR BLD: 1
PROTHROMBIN TIME: 10.6 SEC (ref 9.1–12.3)

## 2022-11-15 LAB
HEPATITIS C GENOTYPE: NORMAL
PORPHYRIN ERYTHROCYTE: 18 UG/DL (ref 0–35)
REASON FOR REJECTION: NORMAL
ZZ NTE CLEAN UP: ORDERED TEST: NORMAL
ZZ NTE WITH NAME CLEAN UP: SPECIMEN SOURCE: NORMAL

## 2022-11-26 LAB
ALANINE AMINOTRANSFERASE, FIBROMETER: 24 U/L (ref 5–40)
ALPHA-2-MACROGLOBULIN, FIBROMETER: 233 MG/DL (ref 131–293)
ASPARTATE AMINOTRANSFERASE, FIBROMETER: 26 U/L (ref 9–40)
CIRRHOMETER PATIENT SCORE: 0
EER FIBROMETER REPORT: NORMAL
FIBROMETER INTERPRETATION: NORMAL
FIBROMETER PATIENT SCORE: 0.1
FIBROMETER PLATELET COUNT: 250
FIBROMETER PROTHROMBIN INDEX: 110 % (ref 90–120)
FIBROSIS METAVIR CLASSIFICATION: NORMAL
GAMMA GLUTAMYL TRANSFERASE, FIBROMETER: 10 U/L (ref 7–33)
INFLAMETER METAVIR CLASSIFICATION: NORMAL
INFLAMETER PATIENT SCORE: 0.21
UREA NITROGEN, FIBROMETER: 12 MG/DL (ref 7–20)

## 2023-02-24 ENCOUNTER — HOSPITAL ENCOUNTER (OUTPATIENT)
Age: 38
Setting detail: SPECIMEN
Discharge: HOME | End: 2023-02-24

## 2023-04-20 ENCOUNTER — HOSPITAL ENCOUNTER (OUTPATIENT)
Age: 38
Setting detail: SPECIMEN
Discharge: HOME | End: 2023-04-20

## 2023-04-21 LAB
HCV RNA SERPL QL NAA+PROBE: NOT DETECTED
SOURCE: NORMAL
TSH SERPL-ACNC: 1.63 UIU/ML (ref 0.3–5)

## 2023-05-15 ENCOUNTER — HOSPITAL ENCOUNTER (OUTPATIENT)
Age: 38
Setting detail: SPECIMEN
Discharge: HOME | End: 2023-05-15

## 2023-05-16 LAB
CANDIDA SPECIES, DNA PROBE: NEGATIVE
GARDNERELLA VAGINALIS, DNA PROBE: POSITIVE
SOURCE: ABNORMAL
TRICHOMONAS VAGINALIS DNA: NEGATIVE

## 2023-05-17 LAB
C TRACH DNA SPEC QL PROBE+SIG AMP: NEGATIVE
N GONORRHOEA DNA SPEC QL PROBE+SIG AMP: NEGATIVE
SPECIMEN DESCRIPTION: NORMAL

## 2023-05-18 LAB
HPV SAMPLE: NORMAL
HPV, GENOTYPE 16: NOT DETECTED
HPV, GENOTYPE 18: NOT DETECTED
HPV, HIGH RISK OTHER: NOT DETECTED
HPV, INTERPRETATION: NORMAL
SPECIMEN DESCRIPTION: NORMAL

## 2023-05-19 LAB
CYTOLOGY REPORT: NORMAL
HCV RNA # SERPL NAA+PROBE: NOT DETECTED {COPIES}/ML
SPECIMEN SOURCE: NORMAL

## 2023-08-08 ENCOUNTER — HOSPITAL ENCOUNTER (OUTPATIENT)
Age: 38
Setting detail: SPECIMEN
Discharge: HOME | End: 2023-08-08

## 2023-08-09 LAB
ALBUMIN SERPL-MCNC: 3.9 G/DL (ref 3.5–5.2)
ALBUMIN/GLOB SERPL: 1.3 {RATIO} (ref 1–2.5)
ALP SERPL-CCNC: 65 U/L (ref 35–104)
ALT SERPL-CCNC: 10 U/L (ref 5–33)
ANION GAP SERPL CALCULATED.3IONS-SCNC: 11 MMOL/L (ref 9–17)
AST SERPL-CCNC: 16 U/L
BASOPHILS # BLD: 0.03 K/UL (ref 0–0.2)
BASOPHILS NFR BLD: 0 % (ref 0–2)
BILIRUB SERPL-MCNC: 0.3 MG/DL (ref 0.3–1.2)
BUN SERPL-MCNC: 10 MG/DL (ref 6–20)
CALCIUM SERPL-MCNC: 9.2 MG/DL (ref 8.6–10.4)
CHLORIDE SERPL-SCNC: 103 MMOL/L (ref 98–107)
CO2 SERPL-SCNC: 25 MMOL/L (ref 20–31)
CREAT SERPL-MCNC: 0.6 MG/DL (ref 0.5–0.9)
EOSINOPHIL # BLD: 0.18 K/UL (ref 0–0.44)
EOSINOPHILS RELATIVE PERCENT: 2 % (ref 1–4)
ERYTHROCYTE [DISTWIDTH] IN BLOOD BY AUTOMATED COUNT: 11.9 % (ref 11.8–14.4)
GFR SERPL CREATININE-BSD FRML MDRD: >60 ML/MIN/1.73M2
GLUCOSE SERPL-MCNC: 87 MG/DL (ref 70–99)
HCG SERPL QL: NEGATIVE
HCT VFR BLD AUTO: 46 % (ref 36.3–47.1)
HGB BLD-MCNC: 15.3 G/DL (ref 11.9–15.1)
IMM GRANULOCYTES # BLD AUTO: <0.03 K/UL (ref 0–0.3)
IMM GRANULOCYTES NFR BLD: 0 %
LYMPHOCYTES NFR BLD: 1.84 K/UL (ref 1.1–3.7)
LYMPHOCYTES RELATIVE PERCENT: 23 % (ref 24–43)
MCH RBC QN AUTO: 33.3 PG (ref 25.2–33.5)
MCHC RBC AUTO-ENTMCNC: 33.3 G/DL (ref 28.4–34.8)
MCV RBC AUTO: 100.2 FL (ref 82.6–102.9)
MONOCYTES NFR BLD: 0.6 K/UL (ref 0.1–1.2)
MONOCYTES NFR BLD: 8 % (ref 3–12)
NEUTROPHILS NFR BLD: 67 % (ref 36–65)
NEUTS SEG NFR BLD: 5.26 K/UL (ref 1.5–8.1)
NRBC BLD-RTO: 0 PER 100 WBC
PLATELET # BLD AUTO: 253 K/UL (ref 138–453)
PMV BLD AUTO: 10.6 FL (ref 8.1–13.5)
POTASSIUM SERPL-SCNC: 4.2 MMOL/L (ref 3.7–5.3)
PROT SERPL-MCNC: 6.9 G/DL (ref 6.4–8.3)
RBC # BLD AUTO: 4.59 M/UL (ref 3.95–5.11)
SODIUM SERPL-SCNC: 139 MMOL/L (ref 135–144)
WBC OTHER # BLD: 7.9 K/UL (ref 3.5–11.3)

## 2023-08-10 LAB
HCV RNA # SERPL NAA+PROBE: NOT DETECTED {COPIES}/ML
SPECIMEN SOURCE: NORMAL

## 2023-09-05 ENCOUNTER — HOSPITAL ENCOUNTER (EMERGENCY)
Age: 38
Discharge: HOME | End: 2023-09-05
Attending: EMERGENCY MEDICINE
Payer: COMMERCIAL

## 2023-09-05 ENCOUNTER — APPOINTMENT (OUTPATIENT)
Dept: GENERAL RADIOLOGY | Age: 38
End: 2023-09-05
Payer: COMMERCIAL

## 2023-09-05 VITALS
RESPIRATION RATE: 17 BRPM | HEART RATE: 83 BPM | OXYGEN SATURATION: 100 % | DIASTOLIC BLOOD PRESSURE: 74 MMHG | HEIGHT: 67 IN | SYSTOLIC BLOOD PRESSURE: 112 MMHG | WEIGHT: 230 LBS | BODY MASS INDEX: 36.1 KG/M2 | TEMPERATURE: 98.2 F

## 2023-09-05 DIAGNOSIS — R60.0 BILATERAL LEG EDEMA: Primary | ICD-10-CM

## 2023-09-05 LAB
ALBUMIN SERPL BCG-MCNC: 4 G/DL (ref 3.5–5.1)
ALP SERPL-CCNC: 62 U/L (ref 38–126)
ALT SERPL W/O P-5'-P-CCNC: 13 U/L (ref 11–66)
ANION GAP SERPL CALC-SCNC: 10 MEQ/L (ref 8–16)
AST SERPL-CCNC: 14 U/L (ref 5–40)
BASOPHILS ABSOLUTE: 0 THOU/MM3 (ref 0–0.1)
BASOPHILS NFR BLD AUTO: 0.4 %
BILIRUB CONJ SERPL-MCNC: < 0.2 MG/DL (ref 0–0.3)
BILIRUB SERPL-MCNC: 0.5 MG/DL (ref 0.3–1.2)
BUN SERPL-MCNC: 10 MG/DL (ref 7–22)
CALCIUM SERPL-MCNC: 8.9 MG/DL (ref 8.5–10.5)
CHLORIDE SERPL-SCNC: 101 MEQ/L (ref 98–111)
CO2 SERPL-SCNC: 26 MEQ/L (ref 23–33)
CREAT SERPL-MCNC: 0.7 MG/DL (ref 0.4–1.2)
DEPRECATED RDW RBC AUTO: 44.6 FL (ref 35–45)
EOSINOPHIL NFR BLD AUTO: 0.9 %
EOSINOPHILS ABSOLUTE: 0.1 THOU/MM3 (ref 0–0.4)
ERYTHROCYTE [DISTWIDTH] IN BLOOD BY AUTOMATED COUNT: 12.2 % (ref 11.5–14.5)
GFR SERPL CREATININE-BSD FRML MDRD: > 60 ML/MIN/1.73M2
GLUCOSE SERPL-MCNC: 102 MG/DL (ref 70–108)
HCT VFR BLD AUTO: 41.2 % (ref 37–47)
HGB BLD-MCNC: 13.9 GM/DL (ref 12–16)
IMM GRANULOCYTES # BLD AUTO: 0.02 THOU/MM3 (ref 0–0.07)
IMM GRANULOCYTES NFR BLD AUTO: 0.3 %
LYMPHOCYTES ABSOLUTE: 1.8 THOU/MM3 (ref 1–4.8)
LYMPHOCYTES NFR BLD AUTO: 22.3 %
MCH RBC QN AUTO: 33.5 PG (ref 26–33)
MCHC RBC AUTO-ENTMCNC: 33.7 GM/DL (ref 32.2–35.5)
MCV RBC AUTO: 99.3 FL (ref 81–99)
MONOCYTES ABSOLUTE: 0.5 THOU/MM3 (ref 0.4–1.3)
MONOCYTES NFR BLD AUTO: 6.1 %
NEUTROPHILS NFR BLD AUTO: 70 %
NRBC BLD AUTO-RTO: 0 /100 WBC
NT-PROBNP SERPL IA-MCNC: 180.6 PG/ML (ref 0–124)
OSMOLALITY SERPL CALC.SUM OF ELEC: 273.1 MOSMOL/KG (ref 275–300)
PLATELET # BLD AUTO: 265 THOU/MM3 (ref 130–400)
PMV BLD AUTO: 9.5 FL (ref 9.4–12.4)
POTASSIUM SERPL-SCNC: 3.8 MEQ/L (ref 3.5–5.2)
PROT SERPL-MCNC: 6.8 G/DL (ref 6.1–8)
RBC # BLD AUTO: 4.15 MILL/MM3 (ref 4.2–5.4)
SEGMENTED NEUTROPHILS ABSOLUTE COUNT: 5.5 THOU/MM3 (ref 1.8–7.7)
SODIUM SERPL-SCNC: 137 MEQ/L (ref 135–145)
TROPONIN, HIGH SENSITIVITY: < 6 NG/L (ref 0–12)
WBC # BLD AUTO: 7.9 THOU/MM3 (ref 4.8–10.8)

## 2023-09-05 PROCEDURE — 71045 X-RAY EXAM CHEST 1 VIEW: CPT

## 2023-09-05 PROCEDURE — 85025 COMPLETE CBC W/AUTO DIFF WBC: CPT

## 2023-09-05 PROCEDURE — 80076 HEPATIC FUNCTION PANEL: CPT

## 2023-09-05 PROCEDURE — 84484 ASSAY OF TROPONIN QUANT: CPT

## 2023-09-05 PROCEDURE — 36415 COLL VENOUS BLD VENIPUNCTURE: CPT

## 2023-09-05 PROCEDURE — 80048 BASIC METABOLIC PNL TOTAL CA: CPT

## 2023-09-05 PROCEDURE — 83880 ASSAY OF NATRIURETIC PEPTIDE: CPT

## 2023-09-05 PROCEDURE — 99285 EMERGENCY DEPT VISIT HI MDM: CPT

## 2023-09-05 PROCEDURE — 93005 ELECTROCARDIOGRAM TRACING: CPT | Performed by: EMERGENCY MEDICINE

## 2023-09-05 RX ORDER — FUROSEMIDE 20 MG/1
20 TABLET ORAL 2 TIMES DAILY
Qty: 14 TABLET | Refills: 0 | Status: SHIPPED | OUTPATIENT
Start: 2023-09-05 | End: 2023-09-12

## 2023-09-05 ASSESSMENT — PAIN SCALES - GENERAL
PAINLEVEL_OUTOF10: 5
PAINLEVEL_OUTOF10: 5

## 2023-09-05 ASSESSMENT — PAIN DESCRIPTION - LOCATION: LOCATION: LEG

## 2023-09-05 ASSESSMENT — PAIN DESCRIPTION - ORIENTATION: ORIENTATION: RIGHT;LEFT

## 2023-09-05 ASSESSMENT — PAIN - FUNCTIONAL ASSESSMENT
PAIN_FUNCTIONAL_ASSESSMENT: 0-10
PAIN_FUNCTIONAL_ASSESSMENT: 0-10

## 2023-09-05 NOTE — ED TRIAGE NOTES
Pt to ED from home with c/o bilateral leg swelling. Pt states it's been going on intermittently for the last year. States she has been seen at [facility] as well as her Physician's office to determine a cause. Pt reports the swelling increased this morning. /98 at this time. Pt states she no longer takes her antihypertensive medications.

## 2023-09-05 NOTE — DISCHARGE INSTRUCTIONS
Follow-up with your cardiologist as scheduled to arrange for outpatient echocardiogram.  In the meantime we will start you on Lasix for the next few days for your swelling. Your doctor should recheck you after that to see if this should be continued. Return to ER for any new pain, shortness of breath or any new symptoms or concerns.

## 2023-09-06 LAB
EKG ATRIAL RATE: 76 BPM
EKG P AXIS: 79 DEGREES
EKG P-R INTERVAL: 190 MS
EKG Q-T INTERVAL: 402 MS
EKG QRS DURATION: 82 MS
EKG QTC CALCULATION (BAZETT): 452 MS
EKG R AXIS: 47 DEGREES
EKG T AXIS: 24 DEGREES
EKG VENTRICULAR RATE: 76 BPM

## 2023-09-06 PROCEDURE — 93010 ELECTROCARDIOGRAM REPORT: CPT | Performed by: INTERNAL MEDICINE

## 2023-09-07 NOTE — ED PROVIDER NOTES
[facility] EMERGENCY DEPT  [address] [facility],[address] [address]  Phone: [phone]      CHIEF COMPLAINT       Chief Complaint   Patient presents with    Leg Swelling     Bilateral       Nurses Notes reviewed and I agree except as noted in the HPI. HISTORY OF PRESENT ILLNESS    Jaimee Ramirez is a 40 y.o. female. Patient has had ongoing swelling in bilateral lower extremities. Is never been more on one side than the other. She states that she has been in the hospital for this previously. It sounds like she has not had an echocardiogram.  There was concern on the prior x-ray there could be some evidence of cardiomegaly. The patient does not complain any chest pain or shortness of breath    REVIEW OF SYSTEMS       No fever or coughing    Remainder of review of systems is otherwise reviewed as negative. PAST MEDICAL HISTORY    has a past medical history of Hepatitis C.    SURGICAL HISTORY      has a past surgical history that includes Tubal ligation (2016). CURRENT MEDICATIONS       Discharge Medication List as of 9/5/2023  6:14 PM        CONTINUE these medications which have NOT CHANGED    Details   buprenorphine-naloxone (SUBOXONE) 12-3 MG sublingual film Place 1 Film under the tongue daily. Historical Med      buPROPion (WELLBUTRIN XL) 150 MG extended release tablet buPROPion Wellbutrin  MG Oral Tablet Extended Release 24 Hour 08/12/2020 Provider: 08-  [facility] [address] ([id number])Historical Med      hydrOXYzine (VISTARIL) 25 MG capsule hydrOXYzine Vistaril 25 MG Oral Capsule 08/12/2020 Provider: 08-  [facility] [address] ([id number])Historical Med      traZODone (DESYREL) 50 MG tablet traZODone traZODone HCl 50 MG Oral Tablet 08/12/2020 Provider: 08-  [facility] [address] ([id number])Historical Med      naltrexone (DEPADE) 50 MG tablet Naltrexone Naltrexone HCl 50 MG Oral Tablet 08/12/2020 Provider: 08-  [facility] [address]

## 2025-03-20 ENCOUNTER — HOSPITAL ENCOUNTER (OUTPATIENT)
Dept: ULTRASOUND IMAGING | Age: 40
Discharge: HOME | End: 2025-03-20
Attending: RADIOLOGY

## 2025-03-20 DIAGNOSIS — Z00.6 EXAMINATION FOR NORMAL COMPARISON OR CONTROL IN CLINICAL RESEARCH: ICD-10-CM

## 2025-03-26 ENCOUNTER — OFFICE VISIT (OUTPATIENT)
Dept: SURGERY | Age: 40
End: 2025-03-26
Payer: COMMERCIAL

## 2025-03-26 ENCOUNTER — PREP FOR PROCEDURE (OUTPATIENT)
Dept: SURGERY | Age: 40
End: 2025-03-26

## 2025-03-26 VITALS
DIASTOLIC BLOOD PRESSURE: 62 MMHG | HEART RATE: 68 BPM | OXYGEN SATURATION: 98 % | BODY MASS INDEX: 33.18 KG/M2 | TEMPERATURE: 97.9 F | SYSTOLIC BLOOD PRESSURE: 102 MMHG | WEIGHT: 211.4 LBS | HEIGHT: 67 IN

## 2025-03-26 DIAGNOSIS — K42.0 INCARCERATED UMBILICAL HERNIA: Primary | ICD-10-CM

## 2025-03-26 PROBLEM — K42.9 UMBILICAL HERNIA: Status: ACTIVE | Noted: 2025-03-26

## 2025-03-26 PROCEDURE — 4004F PT TOBACCO SCREEN RCVD TLK: CPT | Performed by: SURGERY

## 2025-03-26 PROCEDURE — G8417 CALC BMI ABV UP PARAM F/U: HCPCS | Performed by: SURGERY

## 2025-03-26 PROCEDURE — 99204 OFFICE O/P NEW MOD 45 MIN: CPT | Performed by: SURGERY

## 2025-03-26 PROCEDURE — G8427 DOCREV CUR MEDS BY ELIG CLIN: HCPCS | Performed by: SURGERY

## 2025-03-26 RX ORDER — ACETAMINOPHEN 325 MG/1
650 TABLET ORAL EVERY 6 HOURS PRN
COMMUNITY

## 2025-04-11 ENCOUNTER — PREP FOR PROCEDURE (OUTPATIENT)
Dept: SURGERY | Age: 40
End: 2025-04-11

## 2025-04-11 RX ORDER — SODIUM CHLORIDE 0.9 % (FLUSH) 0.9 %
5-40 SYRINGE (ML) INJECTION PRN
Status: CANCELLED | OUTPATIENT
Start: 2025-04-11

## 2025-04-11 RX ORDER — SODIUM CHLORIDE 9 MG/ML
INJECTION, SOLUTION INTRAVENOUS PRN
Status: CANCELLED | OUTPATIENT
Start: 2025-04-11

## 2025-04-11 RX ORDER — SODIUM CHLORIDE 9 MG/ML
INJECTION, SOLUTION INTRAVENOUS CONTINUOUS
Status: CANCELLED | OUTPATIENT
Start: 2025-04-11

## 2025-04-11 RX ORDER — SODIUM CHLORIDE 0.9 % (FLUSH) 0.9 %
5-40 SYRINGE (ML) INJECTION EVERY 12 HOURS SCHEDULED
Status: CANCELLED | OUTPATIENT
Start: 2025-04-11

## 2025-04-11 NOTE — H&P
Rosa Tesfaye (:  1985)      ASSESSMENT:  1.  Incarcerated umbilical hernia     PLAN:  1. Schedule Rosa for robotic possible open repair incarcerate umbilical hernia with mesh.  2. She will undergo pre-operative clearance per anesthesia guidelines with risk factors listed under the past medical history diagnosis & problem list.  3. The risks, benefits and alternatives were discussed with Rosa including non-operative management.  Pros and cons of robotic, laparoscopic and open techniques discussed.  Pros and cons of mesh insertion discussed.  All questions answered. She understands and wishes to proceed with surgical intervention.  4. Restrictions discussed with Rosa and she expresses understanding.  5. She is advised to call back directly if there are further questions/concerns, or if her symptoms worsen prior to surgery.        SUBJECTIVE/OBJECTIVE:          Chief Complaint   Patient presents with    Surgical Consult       NP ref by RACHEL Calderon - Periumbilical hernia      HPI  Rosa is a 39-year-old female who presents for initial evaluation secondary to a painful incarcerated umbilical hernia.  Prior imaging has demonstrated a 2-3 cm hernia.  She states she originally got it after her pregnancy.  Admits it has slowly increased in size.  Not able to reduce it very often.  Tender.  Discomfort worse with increased activity, lifting and bending over.  Worsens with certain positions and when she is more active throughout the day.  Improves with rest.  Feels bloated at times.  Still tolerating diet.  No nausea or vomiting.  Normal bowel function.  No hematochezia or melena.  No new urinary complaints.  Denies any major abdominal surgeries in the past other than a tubal ligation.     Review of Systems   Constitutional:  Negative for activity change, appetite change, chills, diaphoresis, fatigue, fever and unexpected weight change.   HENT:  Negative for congestion, dental problem, drooling,

## 2025-04-14 RX ORDER — ACETAMINOPHEN 325 MG/1
650 TABLET ORAL EVERY 6 HOURS PRN
Qty: 120 TABLET | Status: CANCELLED
Start: 2025-04-14

## 2025-04-15 ENCOUNTER — ANESTHESIA EVENT (OUTPATIENT)
Dept: OPERATING ROOM | Age: 40
End: 2025-04-15
Payer: COMMERCIAL

## 2025-04-15 ENCOUNTER — ANESTHESIA (OUTPATIENT)
Dept: OPERATING ROOM | Age: 40
End: 2025-04-15
Payer: COMMERCIAL

## 2025-04-15 ENCOUNTER — HOSPITAL ENCOUNTER (OUTPATIENT)
Age: 40
Setting detail: OUTPATIENT SURGERY
Discharge: HOME | End: 2025-04-15
Attending: SURGERY | Admitting: SURGERY
Payer: COMMERCIAL

## 2025-04-15 VITALS
RESPIRATION RATE: 16 BRPM | SYSTOLIC BLOOD PRESSURE: 136 MMHG | DIASTOLIC BLOOD PRESSURE: 78 MMHG | TEMPERATURE: 97.4 F | HEIGHT: 67 IN | HEART RATE: 71 BPM | BODY MASS INDEX: 32.65 KG/M2 | WEIGHT: 208 LBS | OXYGEN SATURATION: 95 %

## 2025-04-15 DIAGNOSIS — Z87.19 S/P REPAIR OF VENTRAL HERNIA: Primary | ICD-10-CM

## 2025-04-15 DIAGNOSIS — Z98.890 S/P REPAIR OF VENTRAL HERNIA: Primary | ICD-10-CM

## 2025-04-15 PROCEDURE — 6360000002 HC RX W HCPCS: Performed by: NURSE PRACTITIONER

## 2025-04-15 PROCEDURE — 6360000002 HC RX W HCPCS: Performed by: SURGERY

## 2025-04-15 PROCEDURE — 6360000002 HC RX W HCPCS

## 2025-04-15 PROCEDURE — C1781 MESH (IMPLANTABLE): HCPCS | Performed by: SURGERY

## 2025-04-15 PROCEDURE — 6360000002 HC RX W HCPCS: Performed by: NURSE ANESTHETIST, CERTIFIED REGISTERED

## 2025-04-15 PROCEDURE — 7100000000 HC PACU RECOVERY - FIRST 15 MIN: Performed by: SURGERY

## 2025-04-15 PROCEDURE — 3600000019 HC SURGERY ROBOT ADDTL 15MIN: Performed by: SURGERY

## 2025-04-15 PROCEDURE — 2580000003 HC RX 258: Performed by: SURGERY

## 2025-04-15 PROCEDURE — 2580000003 HC RX 258: Performed by: NURSE PRACTITIONER

## 2025-04-15 PROCEDURE — 3700000000 HC ANESTHESIA ATTENDED CARE: Performed by: SURGERY

## 2025-04-15 PROCEDURE — 3600000009 HC SURGERY ROBOT BASE: Performed by: SURGERY

## 2025-04-15 PROCEDURE — 2500000003 HC RX 250 WO HCPCS: Performed by: NURSE ANESTHETIST, CERTIFIED REGISTERED

## 2025-04-15 PROCEDURE — 7100000011 HC PHASE II RECOVERY - ADDTL 15 MIN: Performed by: SURGERY

## 2025-04-15 PROCEDURE — 49592 RPR AA HRN 1ST < 3 NCR/STRN: CPT | Performed by: SURGERY

## 2025-04-15 PROCEDURE — 2709999900 HC NON-CHARGEABLE SUPPLY: Performed by: SURGERY

## 2025-04-15 PROCEDURE — S2900 ROBOTIC SURGICAL SYSTEM: HCPCS | Performed by: SURGERY

## 2025-04-15 PROCEDURE — 6370000000 HC RX 637 (ALT 250 FOR IP)

## 2025-04-15 PROCEDURE — 6360000002 HC RX W HCPCS: Performed by: ANESTHESIOLOGY

## 2025-04-15 PROCEDURE — 7100000010 HC PHASE II RECOVERY - FIRST 15 MIN: Performed by: SURGERY

## 2025-04-15 PROCEDURE — 3700000001 HC ADD 15 MINUTES (ANESTHESIA): Performed by: SURGERY

## 2025-04-15 PROCEDURE — 7100000001 HC PACU RECOVERY - ADDTL 15 MIN: Performed by: SURGERY

## 2025-04-15 PROCEDURE — 2500000003 HC RX 250 WO HCPCS: Performed by: NURSE PRACTITIONER

## 2025-04-15 DEVICE — PHASIX ST MESH WITH ECHO 2 POSITIONING SYSTEM, 10 CM X 15 CM (4" X 6"), ELLIPSE
Type: IMPLANTABLE DEVICE | Site: ABDOMEN | Status: FUNCTIONAL
Brand: PHASIX

## 2025-04-15 RX ORDER — SODIUM CHLORIDE 9 MG/ML
INJECTION, SOLUTION INTRAVENOUS PRN
Status: CANCELLED | OUTPATIENT
Start: 2025-04-15

## 2025-04-15 RX ORDER — FENTANYL CITRATE 50 UG/ML
50 INJECTION, SOLUTION INTRAMUSCULAR; INTRAVENOUS EVERY 5 MIN PRN
Status: COMPLETED | OUTPATIENT
Start: 2025-04-15 | End: 2025-04-15

## 2025-04-15 RX ORDER — MIDAZOLAM HYDROCHLORIDE 1 MG/ML
INJECTION, SOLUTION INTRAMUSCULAR; INTRAVENOUS
Status: DISCONTINUED | OUTPATIENT
Start: 2025-04-15 | End: 2025-04-15 | Stop reason: SDUPTHER

## 2025-04-15 RX ORDER — OXYCODONE HYDROCHLORIDE 5 MG/1
5 TABLET ORAL EVERY 4 HOURS PRN
Status: DISCONTINUED | OUTPATIENT
Start: 2025-04-15 | End: 2025-04-15 | Stop reason: HOSPADM

## 2025-04-15 RX ORDER — FENTANYL CITRATE 50 UG/ML
50 INJECTION, SOLUTION INTRAMUSCULAR; INTRAVENOUS EVERY 5 MIN PRN
Status: DISCONTINUED | OUTPATIENT
Start: 2025-04-15 | End: 2025-04-15 | Stop reason: SDUPTHER

## 2025-04-15 RX ORDER — ACETAMINOPHEN 325 MG/1
650 TABLET ORAL EVERY 6 HOURS PRN
Qty: 120 TABLET | Refills: 3
Start: 2025-04-15

## 2025-04-15 RX ORDER — ONDANSETRON 4 MG/1
4 TABLET, ORALLY DISINTEGRATING ORAL EVERY 8 HOURS PRN
Status: CANCELLED | OUTPATIENT
Start: 2025-04-15

## 2025-04-15 RX ORDER — ACETAMINOPHEN 325 MG/1
650 TABLET ORAL EVERY 4 HOURS PRN
Status: CANCELLED | OUTPATIENT
Start: 2025-04-15

## 2025-04-15 RX ORDER — FENTANYL CITRATE 50 UG/ML
50 INJECTION, SOLUTION INTRAMUSCULAR; INTRAVENOUS EVERY 5 MIN PRN
Status: DISCONTINUED | OUTPATIENT
Start: 2025-04-15 | End: 2025-04-15

## 2025-04-15 RX ORDER — DEXAMETHASONE SODIUM PHOSPHATE 4 MG/ML
INJECTION, SOLUTION INTRA-ARTICULAR; INTRALESIONAL; INTRAMUSCULAR; INTRAVENOUS; SOFT TISSUE
Status: DISCONTINUED | OUTPATIENT
Start: 2025-04-15 | End: 2025-04-15 | Stop reason: SDUPTHER

## 2025-04-15 RX ORDER — SODIUM CHLORIDE 9 MG/ML
INJECTION, SOLUTION INTRAVENOUS CONTINUOUS
Status: DISCONTINUED | OUTPATIENT
Start: 2025-04-15 | End: 2025-04-15 | Stop reason: HOSPADM

## 2025-04-15 RX ORDER — SODIUM CHLORIDE 0.9 % (FLUSH) 0.9 %
5-40 SYRINGE (ML) INJECTION PRN
Status: CANCELLED | OUTPATIENT
Start: 2025-04-15

## 2025-04-15 RX ORDER — HYDROMORPHONE HYDROCHLORIDE 2 MG/ML
INJECTION, SOLUTION INTRAMUSCULAR; INTRAVENOUS; SUBCUTANEOUS
Status: DISCONTINUED | OUTPATIENT
Start: 2025-04-15 | End: 2025-04-15 | Stop reason: SDUPTHER

## 2025-04-15 RX ORDER — DROPERIDOL 2.5 MG/ML
0.62 INJECTION, SOLUTION INTRAMUSCULAR; INTRAVENOUS
Status: DISCONTINUED | OUTPATIENT
Start: 2025-04-15 | End: 2025-04-15 | Stop reason: HOSPADM

## 2025-04-15 RX ORDER — SODIUM CHLORIDE 0.9 % (FLUSH) 0.9 %
5-40 SYRINGE (ML) INJECTION EVERY 12 HOURS SCHEDULED
Status: CANCELLED | OUTPATIENT
Start: 2025-04-15

## 2025-04-15 RX ORDER — MORPHINE SULFATE 2 MG/ML
2 INJECTION, SOLUTION INTRAMUSCULAR; INTRAVENOUS
Status: CANCELLED | OUTPATIENT
Start: 2025-04-15

## 2025-04-15 RX ORDER — GLYCOPYRROLATE 0.2 MG/ML
0.4 INJECTION INTRAMUSCULAR; INTRAVENOUS ONCE
Status: COMPLETED | OUTPATIENT
Start: 2025-04-15 | End: 2025-04-15

## 2025-04-15 RX ORDER — FENTANYL CITRATE 50 UG/ML
INJECTION, SOLUTION INTRAMUSCULAR; INTRAVENOUS
Status: DISCONTINUED | OUTPATIENT
Start: 2025-04-15 | End: 2025-04-15 | Stop reason: SDUPTHER

## 2025-04-15 RX ORDER — KETOROLAC TROMETHAMINE 10 MG/1
10 TABLET, FILM COATED ORAL EVERY 8 HOURS PRN
Qty: 15 TABLET | Refills: 0 | Status: SHIPPED | OUTPATIENT
Start: 2025-04-15

## 2025-04-15 RX ORDER — SODIUM CHLORIDE 0.9 % (FLUSH) 0.9 %
5-40 SYRINGE (ML) INJECTION PRN
Status: DISCONTINUED | OUTPATIENT
Start: 2025-04-15 | End: 2025-04-15 | Stop reason: HOSPADM

## 2025-04-15 RX ORDER — SODIUM CHLORIDE 0.9 % (FLUSH) 0.9 %
5-40 SYRINGE (ML) INJECTION EVERY 12 HOURS SCHEDULED
Status: DISCONTINUED | OUTPATIENT
Start: 2025-04-15 | End: 2025-04-15 | Stop reason: HOSPADM

## 2025-04-15 RX ORDER — PROPOFOL 10 MG/ML
INJECTION, EMULSION INTRAVENOUS
Status: DISCONTINUED | OUTPATIENT
Start: 2025-04-15 | End: 2025-04-15 | Stop reason: SDUPTHER

## 2025-04-15 RX ORDER — LIDOCAINE HCL/PF 100 MG/5ML
SYRINGE (ML) INJECTION
Status: DISCONTINUED | OUTPATIENT
Start: 2025-04-15 | End: 2025-04-15 | Stop reason: SDUPTHER

## 2025-04-15 RX ORDER — KETOROLAC TROMETHAMINE 30 MG/ML
30 INJECTION, SOLUTION INTRAMUSCULAR; INTRAVENOUS ONCE
Status: COMPLETED | OUTPATIENT
Start: 2025-04-15 | End: 2025-04-15

## 2025-04-15 RX ORDER — HYDROCODONE BITARTRATE AND ACETAMINOPHEN 5; 325 MG/1; MG/1
1-2 TABLET ORAL EVERY 6 HOURS PRN
Qty: 20 TABLET | Refills: 0 | Status: SHIPPED | OUTPATIENT
Start: 2025-04-15 | End: 2025-04-20

## 2025-04-15 RX ORDER — GLYCOPYRROLATE 0.2 MG/ML
INJECTION INTRAMUSCULAR; INTRAVENOUS
Status: COMPLETED
Start: 2025-04-15 | End: 2025-04-15

## 2025-04-15 RX ORDER — ONDANSETRON 2 MG/ML
4 INJECTION INTRAMUSCULAR; INTRAVENOUS EVERY 6 HOURS PRN
Status: CANCELLED | OUTPATIENT
Start: 2025-04-15

## 2025-04-15 RX ORDER — FAMOTIDINE 20 MG/1
20 TABLET, FILM COATED ORAL 2 TIMES DAILY
Status: CANCELLED | OUTPATIENT
Start: 2025-04-15

## 2025-04-15 RX ORDER — FENTANYL CITRATE 50 UG/ML
INJECTION, SOLUTION INTRAMUSCULAR; INTRAVENOUS
Status: COMPLETED
Start: 2025-04-15 | End: 2025-04-15

## 2025-04-15 RX ORDER — ROCURONIUM BROMIDE 10 MG/ML
INJECTION, SOLUTION INTRAVENOUS
Status: DISCONTINUED | OUTPATIENT
Start: 2025-04-15 | End: 2025-04-15 | Stop reason: SDUPTHER

## 2025-04-15 RX ORDER — OXYCODONE HYDROCHLORIDE 5 MG/1
10 TABLET ORAL EVERY 4 HOURS PRN
Status: DISCONTINUED | OUTPATIENT
Start: 2025-04-15 | End: 2025-04-15 | Stop reason: HOSPADM

## 2025-04-15 RX ORDER — FENTANYL CITRATE 50 UG/ML
25 INJECTION, SOLUTION INTRAMUSCULAR; INTRAVENOUS EVERY 5 MIN PRN
Status: DISCONTINUED | OUTPATIENT
Start: 2025-04-15 | End: 2025-04-15 | Stop reason: HOSPADM

## 2025-04-15 RX ORDER — MORPHINE SULFATE 4 MG/ML
4 INJECTION, SOLUTION INTRAMUSCULAR; INTRAVENOUS
Status: CANCELLED | OUTPATIENT
Start: 2025-04-15

## 2025-04-15 RX ORDER — ONDANSETRON 2 MG/ML
INJECTION INTRAMUSCULAR; INTRAVENOUS
Status: DISCONTINUED | OUTPATIENT
Start: 2025-04-15 | End: 2025-04-15 | Stop reason: SDUPTHER

## 2025-04-15 RX ORDER — SODIUM CHLORIDE 9 MG/ML
INJECTION, SOLUTION INTRAVENOUS PRN
Status: DISCONTINUED | OUTPATIENT
Start: 2025-04-15 | End: 2025-04-15 | Stop reason: HOSPADM

## 2025-04-15 RX ADMIN — GLYCOPYRROLATE 0.4 MG: 0.2 INJECTION INTRAMUSCULAR; INTRAVENOUS at 14:40

## 2025-04-15 RX ADMIN — DROPERIDOL 0.62 MG: 2.5 INJECTION, SOLUTION INTRAMUSCULAR; INTRAVENOUS at 14:28

## 2025-04-15 RX ADMIN — FENTANYL CITRATE 50 MCG: 50 INJECTION, SOLUTION INTRAMUSCULAR; INTRAVENOUS at 14:47

## 2025-04-15 RX ADMIN — FENTANYL CITRATE 50 MCG: 50 INJECTION, SOLUTION INTRAMUSCULAR; INTRAVENOUS at 14:52

## 2025-04-15 RX ADMIN — Medication 100 MG: at 13:18

## 2025-04-15 RX ADMIN — ONDANSETRON 4 MG: 2 INJECTION, SOLUTION INTRAMUSCULAR; INTRAVENOUS at 14:02

## 2025-04-15 RX ADMIN — PROPOFOL 150 MG: 10 INJECTION, EMULSION INTRAVENOUS at 13:18

## 2025-04-15 RX ADMIN — MIDAZOLAM 2 MG: 1 INJECTION INTRAMUSCULAR; INTRAVENOUS at 13:15

## 2025-04-15 RX ADMIN — SUGAMMADEX 200 MG: 100 INJECTION, SOLUTION INTRAVENOUS at 14:10

## 2025-04-15 RX ADMIN — FENTANYL CITRATE 100 MCG: 50 INJECTION, SOLUTION INTRAMUSCULAR; INTRAVENOUS at 13:18

## 2025-04-15 RX ADMIN — DEXAMETHASONE SODIUM PHOSPHATE 10 MG: 4 INJECTION, SOLUTION INTRAMUSCULAR; INTRAVENOUS at 13:26

## 2025-04-15 RX ADMIN — SODIUM CHLORIDE: 0.9 INJECTION, SOLUTION INTRAVENOUS at 12:44

## 2025-04-15 RX ADMIN — HYDROMORPHONE HYDROCHLORIDE 0.5 MG: 2 INJECTION INTRAMUSCULAR; INTRAVENOUS; SUBCUTANEOUS at 13:48

## 2025-04-15 RX ADMIN — HYDROMORPHONE HYDROCHLORIDE 0.5 MG: 2 INJECTION INTRAMUSCULAR; INTRAVENOUS; SUBCUTANEOUS at 13:42

## 2025-04-15 RX ADMIN — KETOROLAC TROMETHAMINE 30 MG: 30 INJECTION, SOLUTION INTRAMUSCULAR at 16:10

## 2025-04-15 RX ADMIN — FENTANYL CITRATE 50 MCG: 50 INJECTION, SOLUTION INTRAMUSCULAR; INTRAVENOUS at 14:57

## 2025-04-15 RX ADMIN — ROCURONIUM BROMIDE 50 MG: 10 INJECTION, SOLUTION INTRAVENOUS at 13:18

## 2025-04-15 RX ADMIN — Medication 50 MG: at 13:30

## 2025-04-15 RX ADMIN — OXYCODONE 10 MG: 5 TABLET ORAL at 14:57

## 2025-04-15 RX ADMIN — HYDROMORPHONE HYDROCHLORIDE 1 MG: 2 INJECTION INTRAMUSCULAR; INTRAVENOUS; SUBCUTANEOUS at 14:20

## 2025-04-15 RX ADMIN — WATER 2000 MG: 1 INJECTION INTRAMUSCULAR; INTRAVENOUS; SUBCUTANEOUS at 13:12

## 2025-04-15 RX ADMIN — FENTANYL CITRATE 50 MCG: 50 INJECTION, SOLUTION INTRAMUSCULAR; INTRAVENOUS at 15:02

## 2025-04-15 ASSESSMENT — PAIN DESCRIPTION - LOCATION
LOCATION: ABDOMEN

## 2025-04-15 ASSESSMENT — PAIN SCALES - GENERAL
PAINLEVEL_OUTOF10: 5
PAINLEVEL_OUTOF10: 5
PAINLEVEL_OUTOF10: 9
PAINLEVEL_OUTOF10: 6
PAINLEVEL_OUTOF10: 6
PAINLEVEL_OUTOF10: 0

## 2025-04-15 ASSESSMENT — PAIN DESCRIPTION - ORIENTATION
ORIENTATION: MID

## 2025-04-15 ASSESSMENT — PAIN - FUNCTIONAL ASSESSMENT: PAIN_FUNCTIONAL_ASSESSMENT: ACTIVITIES ARE NOT PREVENTED

## 2025-04-15 ASSESSMENT — LIFESTYLE VARIABLES: SMOKING_STATUS: 1

## 2025-04-15 NOTE — PROGRESS NOTES
Patient admitted to [facility] room 10 with family in waiting room. Bed in low position side rails up call light in reach. Patient denies questions at this time.       Francheska [phone]    Declines meds to beds

## 2025-04-15 NOTE — PROGRESS NOTES
1420 Pt arrives to pacu on room air. Pt appears to be in no acute distress acute distress at this time. Respirations easy and unlabored. Pt responsive to voice at this time.   Abdominal incision sites assessed with Esdras RN. CDI x4. Abdominal binder in place. Ice pack placed at incision sites. Pt temp 97.4, but pt says she's cold. Petey britt initiated. VSS.   1428 Pt feeling nauseous, 0.625mg Droperidol given.   1430 Pt HR dropping into the 30-40s. Pt alert. Pt asymptomatic. Dr Levi notified. Verbal order for 0.4mg Robinul per Dr Levi. .   1440 0.4mg Robinul given for HR.   1447 Pt having pain in abdomen, 9/10. 50mcg Fentanyl given. P  1452 Pain unchanged. 50mcg Fentanyl given.   1457 Pain unchanged. 50mcg Fentanyl given. 10mg Angie given. Pt educated on tolerable pain goal and the gradual onset of pain control post op.   1502 Pain unchanged. 50mcg Fentanyl given.   1520 Pt pain improving, 6/10.  1527 Pt continues to have some pain, but becoming more tolerable. Education reinforced. Pt meets criteria for discharge from pacu.   1530 Pt transported to [facility] in stable condition.   1535 Report given to Katiuska ZUNIGA. All questions and concerns addressed at this time.

## 2025-04-15 NOTE — BRIEF OP NOTE
Brief Postoperative Note      Patient: Rosa Tesfaye  YOB: 1985  MRN: [id number]    Date of Procedure: 4/15/2025    Pre-Op Diagnosis Codes:      * Umbilical hernia [K42.9]    Post-Op Diagnosis: Same       Procedure(s):  Robotic umbilical hernia repair with mesh; bilateral abdominal wall tap block    Surgeon(s):  John Paul Oseguera MD    Assistant:  First Assistant: Chepe Renee RN  Physician Assistant: Beth Chaudhari PA-C    Anesthesia: General    Estimated Blood Loss (mL): 5ml    Complications: None    Specimens:   * No specimens in log *    Implants:  Implant Name Type Inv. Item Serial No.  Lot No. LRB No. Used Action   MESH JUAN JOSE CIR [id number] W/ ECHO 2 POS SYS PHASIX ST - [id number]  MESH JUAN JOSE CIR [id number] W/ ECHO 2 POS SYS PHASIX ST  BARD DAVOL-WD [id number] N/A 1 Implanted         Drains: * No LDAs found *    Findings:  Infection Present At Time Of Surgery (PATOS) (choose all levels that have infection present):  No infection present    Other Findings: see op note    Electronically signed by John Paul Oseguera MD on 4/15/2025 at 2:08 PM

## 2025-04-15 NOTE — ANESTHESIA POSTPROCEDURE EVALUATION
Department of Anesthesiology  Postprocedure Note    Patient: Rosa Tesfaye  MRN: [id number]  YOB: 1985  Date of evaluation: 4/15/2025    Procedure Summary       Date: 04/15/25 Room / Location: [facility] OR  / [facility] OR    Anesthesia Start: 1315 Anesthesia Stop: 1425    Procedure: Robotic umbilical hernia repair with mesh (Abdomen) Diagnosis:       Umbilical hernia      (Umbilical hernia [K42.9])    Surgeons: John Paul Oseguera MD Responsible Provider: Sarwat Rand MD    Anesthesia Type: General ASA Status: 2            Anesthesia Type: General    Sylvia Phase I: Sylvia Score: 9    Sylvia Phase II: Sylvia Score: 10    Anesthesia Post Evaluation    Patient location during evaluation: PACU  Patient participation: complete - patient participated  Level of consciousness: awake and alert  Airway patency: patent  Nausea & Vomiting: no nausea and no vomiting  Cardiovascular status: hemodynamically stable  Respiratory status: acceptable  Hydration status: euvolemic  Pain management: adequate    [facility]  POST-ANESTHESIA NOTE       Name:  Rosa Tesfaye                                         Age:  39 y.o.  MRN:  [id number]      Last Vitals:  /78   Pulse 71   Temp 97.4 °F (36.3 °C) (Temporal)   Resp 16   Ht 1.702 m (5' 7\")   Wt 94.3 kg (208 lb)   SpO2 95%   BMI 32.58 kg/m²   Patient Vitals for the past 4 hrs:   BP Pulse Resp SpO2   04/15/25 1652 136/78 71 16 95 %   04/15/25 1600 130/71 80 16 98 %       Level of Consciousness:  Awake    Respiratory:  Stable    Oxygen Saturation:  Stable    Cardiovascular:  Stable    Hydration:  Adequate    PONV:  Stable    Post-op Pain:  Adequate analgesia    Post-op Assessment:  No apparent anesthetic complications    Additional Follow-Up / Treatment / Comment:  None    SARWAT RAND MD  April 15, 2025   7:52 PM      No notable events documented.

## 2025-04-15 NOTE — INTERVAL H&P NOTE
Update History & Physical    The patient's History and Physical was reviewed with the patient and I examined the patient. There was no change. The surgical site was confirmed by the patient and me.     Plan: The risks, benefits, expected outcome, and alternative to the recommended procedure have been discussed with the patient. Patient understands and wants to proceed with the procedure.     Electronically signed by John Paul Oseguera MD on 4/15/2025 at 12:45 PM

## 2025-04-15 NOTE — ANESTHESIA PRE PROCEDURE
Department of Anesthesiology  Preprocedure Note       Name:  Rosa Tesfaye   Age:  39 y.o.  :  1985                                          MRN:  [id number]         Date:  4/15/2025      Surgeon: Surgeon(s):  John Paul Oseguera MD    Procedure: Procedure(s):  Robotic umbilical hernia repair with mesh possible open    Medications prior to admission:   Prior to Admission medications    Medication Sig Start Date End Date Taking? Authorizing Provider   acetaminophen (TYLENOL) 325 MG tablet Take 2 tablets by mouth every 6 hours as needed for Pain    Provider, MD Yesica       Current medications:    Current Facility-Administered Medications   Medication Dose Route Frequency Provider Last Rate Last Admin    sodium chloride flush 0.9 % injection 5-40 mL  5-40 mL IntraVENous 2 times per day Mumtaz Kim APRN - CNP        sodium chloride flush 0.9 % injection 5-40 mL  5-40 mL IntraVENous PRN Mumtaz Kim APRN - CNP        0.9 % sodium chloride infusion   IntraVENous PRN Mumtaz Kim APRN - CNP        0.9 % sodium chloride infusion   IntraVENous Continuous Mumtaz Kim APRN - CNP        ceFAZolin (ANCEF) 2,000 mg in sterile water 20 mL IV syringe  2,000 mg IntraVENous On Call to OR Mumtaz Kim APRN - CNP           Allergies:  No Known Allergies    Problem List:    Patient Active Problem List   Diagnosis Code    Umbilical hernia K42.9       Past Medical History:        Diagnosis Date    ADHD     GERD (gastroesophageal reflux disease)     Hepatitis C     Mood disorder     Obesity     Opioid use        Past Surgical History:        Procedure Laterality Date    INDUCED       TUBAL LIGATION  2016    WISDOM TOOTH EXTRACTION         Social History:    Social History     Tobacco Use    Smoking status: Every Day     Current packs/day: 0.50     Types: Cigarettes    Smokeless tobacco: Former   Substance Use Topics    Alcohol use: No                                Ready to quit: Not

## 2025-04-15 NOTE — PROGRESS NOTES

## 2025-04-15 NOTE — OP NOTE
[address] [address]                            OPERATIVE REPORT      PATIENT NAME: ROBERTO CARLOS SAMUELS              : 1985  MED REC NO: [id number]                       ROOM: [facility]                                               (General) POOL                                               RM    ACCOUNT NO: [id number]                       ADMIT DATE: 04/15/2025  PROVIDER: John Paul Oseguera MD      DATE OF PROCEDURE:  04/15/2025    SURGEON:  John Paul Oseguera MD    PREOPERATIVE DIAGNOSIS:  Incarcerated umbilical hernia.    POSTOPERATIVE DIAGNOSIS:  Incarcerated umbilical hernia.    PROCEDURES:    1. Robotic repair of incarcerated umbilical hernia (3 cm defect) with mesh (10 x 15 cm Phasix ST mesh).  2. Bilateral abdominal wall TAP block.    ASSISTANTS:  ABBEY Clinton and ALBINO Asher    ANESTHESIA:  General.    ESTIMATED BLOOD LOSS:  5 mL.    DRAINS:  None.    COMPLICATIONS:  None.    DISPOSITION:  Stable to the recovery room.    INDICATIONS:  The patient is a 39-year-old female who was seen in the office secondary to a symptomatic incarcerated umbilical hernia.  Both operative and nonoperative intervention plans were discussed.  Risks of surgery were further discussed.  Some of the risks included, but were not limited to, bleeding, infection, the need for reoperation, severe chronic postoperative pain or numbness, major vascular or nerve injury, cardiopulmonary complications, anesthetic complications, seroma or hematoma formation, wound breakdown, trocar site herniation, recurrence of the hernia, mesh infection requiring the removal of the mesh, chronic pain, and death.  After all of the questions were answered in their entirety and the patient was completely aware of the current situation, she elected to proceed with the procedure.    DESCRIPTION OF PROCEDURE:  After informed consent was signed and placed on the chart, the

## 2025-04-16 ENCOUNTER — TELEPHONE (OUTPATIENT)
Dept: SURGERY | Age: 40
End: 2025-04-16

## 2025-04-16 NOTE — TELEPHONE ENCOUNTER
Post procedural phone call placed to patient. Patient utilizing prescribed medication with adequate control of pain.  Patients follow up appointment verified and confirmed in chart. Time spent for patient questions. Patient to follow up with office as needed.

## 2025-05-01 ENCOUNTER — OFFICE VISIT (OUTPATIENT)
Dept: SURGERY | Age: 40
End: 2025-05-01
Payer: COMMERCIAL

## 2025-05-01 VITALS
OXYGEN SATURATION: 98 % | RESPIRATION RATE: 18 BRPM | TEMPERATURE: 97.3 F | BODY MASS INDEX: 32.65 KG/M2 | DIASTOLIC BLOOD PRESSURE: 82 MMHG | HEART RATE: 80 BPM | SYSTOLIC BLOOD PRESSURE: 126 MMHG | HEIGHT: 67 IN | WEIGHT: 208 LBS

## 2025-05-01 DIAGNOSIS — Z09 S/P UMBILICAL HERNIA REPAIR, FOLLOW-UP EXAM: Primary | ICD-10-CM

## 2025-05-01 PROCEDURE — G8427 DOCREV CUR MEDS BY ELIG CLIN: HCPCS

## 2025-05-01 PROCEDURE — G8417 CALC BMI ABV UP PARAM F/U: HCPCS

## 2025-05-01 PROCEDURE — 4004F PT TOBACCO SCREEN RCVD TLK: CPT

## 2025-05-01 PROCEDURE — 99212 OFFICE O/P EST SF 10 MIN: CPT

## 2025-05-01 NOTE — PROGRESS NOTES
[facility] [place] [facility]  [facility] GENERAL SURGERY  [address]  [facility] [address]  Dept: [phone]  Dept Fax: [phone]  Loc: [phone]    Visit Date: 2025    Rosa Tesfaye is a 39 y.o. female who presents today for:  Chief Complaint   Patient presents with    Follow Up After Procedure     S/p Robotic repair of incarcerated umbilical hernia (3 cm defect) with mesh (10 x 15 cm Phasix ST mesh), bilateral abdominal wall TAP block 4/15/25         HPI:     HPI    Rosa is a 39 y.o. female who presents today approximately 2 weeks after a robotic repair of incarcerated umbilical hernia with mesh on 4/15/2025. She is having regular bowel function. She has had some left upper quadrant states pain. Continues to smoke but says she cuts back. No longer on parole. Does smoke marijuana consistently. She states she is trying to follow lifting restrictions but has also picked up some heavy things here and there. She is also been doing some crunches to \"practice for work \". She works at a factory. She says she does not have to lift many heavy things but has to do a lot of bending and climbing at work. Does not feel she is ready to go back yet. Patient denies chest pain, shortness of breath, fevers, nausea, vomiting, diarrhea, hematochezia, dysuria, melena, calf pain, appetite changes or incision site concerns at this time.      Past Medical History:   Diagnosis Date    ADHD     GERD (gastroesophageal reflux disease)     Hepatitis C     Mood disorder     Obesity     Opioid use       Past Surgical History:   Procedure Laterality Date    INDUCED       TUBAL LIGATION  2016    VENTRAL HERNIA REPAIR N/A 4/15/2025    Robotic umbilical hernia repair with mesh performed by John Paul Oseguera MD at [facility] OR    WISDOM TOOTH EXTRACTION         No family history on file.    Social History     Tobacco Use    Smoking status: Every Day     Current packs/day: 0.50     Types:

## 2025-05-07 ASSESSMENT — ENCOUNTER SYMPTOMS: ABDOMINAL PAIN: 1

## 2025-05-22 ENCOUNTER — OFFICE VISIT (OUTPATIENT)
Dept: SURGERY | Age: 40
End: 2025-05-22
Payer: COMMERCIAL

## 2025-05-22 VITALS
TEMPERATURE: 97.5 F | HEART RATE: 68 BPM | OXYGEN SATURATION: 98 % | HEIGHT: 67 IN | SYSTOLIC BLOOD PRESSURE: 108 MMHG | DIASTOLIC BLOOD PRESSURE: 72 MMHG | BODY MASS INDEX: 31.55 KG/M2 | WEIGHT: 201 LBS

## 2025-05-22 DIAGNOSIS — Z09 S/P UMBILICAL HERNIA REPAIR, FOLLOW-UP EXAM: Primary | ICD-10-CM

## 2025-05-22 DIAGNOSIS — G89.18 POST-OPERATIVE PAIN: ICD-10-CM

## 2025-05-22 PROCEDURE — G8427 DOCREV CUR MEDS BY ELIG CLIN: HCPCS

## 2025-05-22 PROCEDURE — G8417 CALC BMI ABV UP PARAM F/U: HCPCS

## 2025-05-22 PROCEDURE — 4004F PT TOBACCO SCREEN RCVD TLK: CPT

## 2025-05-22 PROCEDURE — 99212 OFFICE O/P EST SF 10 MIN: CPT

## 2025-05-22 RX ORDER — DIAZEPAM 5 MG/1
5 TABLET ORAL EVERY 8 HOURS PRN
Qty: 20 TABLET | Refills: 0 | Status: SHIPPED | OUTPATIENT
Start: 2025-05-22 | End: 2025-05-28 | Stop reason: SDUPTHER

## 2025-05-22 RX ORDER — KETOROLAC TROMETHAMINE 10 MG/1
10 TABLET, FILM COATED ORAL EVERY 8 HOURS PRN
Qty: 15 TABLET | Refills: 0 | Status: SHIPPED | OUTPATIENT
Start: 2025-05-22 | End: 2025-05-28 | Stop reason: SDUPTHER

## 2025-05-22 NOTE — PROGRESS NOTES
[facility] [place] [facility]  [facility] GENERAL SURGERY  [address]  [facility] [address]  Dept: [phone]  Dept Fax: [phone]  Loc: [phone]    Visit Date: 5/22/2025    Rosa Tesfaye is a 39 y.o. female who presents today for:  Chief Complaint   Patient presents with    2 Week Follow-Up     S/p Robotic repair of incarcerated umbilical hernia (3 cm defect) with mesh (10 x 15 cm Phasix ST mesh), bilateral abdominal wall TAP block 4/15/25-Last seen 5/01/25       HPI:     DI Lee is a 39 y.o. female who presents today with her boyfriend approximately 5 weeks after a robotic repair of incarcerated umbilical hernia with mesh on 4/15/2025. Patient says she still having left-sided abdominal pain. Denies having any central abdominal pain. Denies any bulge where her hernia previously was. She says the pain is sharp and stabbing in nature. It comes and goes. She says the pain switches between the different port site/incision sites from surgery. She continues to smoke marijuana consistently. Continues to smoke but says she cuts back. She has not yet returned to work. She says she continues to follow lifting restrictions. 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 Due to the abdominal pain she has not gone back yet. She feels she might be ready to return to work soon though. She is able to make her own work schedule and feels this will be accommodating to her abdominal pain. She has not been taking any medication other than Tylenol/Advil for the pain. She wears her abdominal binder occasionally. Patient denies chest pain, shortness of breath, fevers, nausea, vomiting, diarrhea, hematochezia, dysuria, melena, calf pain, appetite changes or incision site concerns at this time.      Past Medical History:   Diagnosis Date    ADHD     GERD (gastroesophageal reflux disease)     Hepatitis C     Mood disorder     Obesity     Opioid use

## 2025-05-28 DIAGNOSIS — Z09 S/P UMBILICAL HERNIA REPAIR, FOLLOW-UP EXAM: ICD-10-CM

## 2025-05-28 RX ORDER — DIAZEPAM 5 MG/1
5 TABLET ORAL EVERY 8 HOURS PRN
Qty: 20 TABLET | Refills: 0 | Status: SHIPPED | OUTPATIENT
Start: 2025-05-28 | End: 2025-06-02

## 2025-05-28 RX ORDER — KETOROLAC TROMETHAMINE 10 MG/1
10 TABLET, FILM COATED ORAL EVERY 8 HOURS PRN
Qty: 15 TABLET | Refills: 0 | Status: SHIPPED | OUTPATIENT
Start: 2025-05-28

## 2025-05-28 NOTE — TELEPHONE ENCOUNTER
Patient called into office with a request for refill on medications Valium and Toradol. Patient has been doing well and has been able to wean down on medications. Patient states returning to work has led to some discomfort but is otherwise doing ok. If approved please send to [facility] - [place], [place] - [facility] [address] - P [phone] - F [phone] [[id number]]

## (undated) DEVICE — KIT DETRGNT ENZYMATIC SOLUTION 100 ML SOAK SHLD 5 VI LG HUMD

## (undated) DEVICE — GLOVE ORANGE PI 8   MSG9080

## (undated) DEVICE — INSUFFLATION NEEDLE TO ESTABLISH PNEUMOPERITONEUM.: Brand: INSUFFLATION NEEDLE

## (undated) DEVICE — TUBING INSUFFLATOR HEAT HUMIDIFIED SMK EVAC SET PNEUMOCLEAR

## (undated) DEVICE — LIQUIBAND RAPID ADHESIVE 36/CS 0.8ML: Brand: MEDLINE

## (undated) DEVICE — ARM DRAPE

## (undated) DEVICE — BLADELESS OBTURATOR: Brand: WECK VISTA

## (undated) DEVICE — GENERAL LAPAROSCOPY-LF: Brand: MEDLINE INDUSTRIES, INC.

## (undated) DEVICE — SUTURE VICRYL + SZ 0 L27IN ABSRB VLT L26MM UR-6 5/8 CIR VCP603H

## (undated) DEVICE — GLOVE ORANGE PI 7   MSG9070

## (undated) DEVICE — REDUCER: Brand: ENDOWRIST

## (undated) DEVICE — BASIC SINGLE BASIN BTC-LF: Brand: MEDLINE INDUSTRIES, INC.

## (undated) DEVICE — BINDER ABD UNISX 4 PNL PREM 6INX6INX12IN L XL 4

## (undated) DEVICE — SYRINGE MED 30ML STD CLR PLAS LUERLOCK TIP N CTRL DISP

## (undated) DEVICE — GOWN,SIRUS,NON REINFRCD,LARGE,SET IN SL: Brand: MEDLINE

## (undated) DEVICE — SEAL

## (undated) DEVICE — ELECTRO LUBE IS A SINGLE PATIENT USE DEVICE THAT IS INTENDED TO BE USED ON ELECTROSURGICAL ELECTRODES TO REDUCE STICKING.: Brand: KEY SURGICAL ELECTRO LUBE

## (undated) DEVICE — SUTURE STRATAFIX SYMMETRIC SZ 1 L18IN ABSRB VLT CT1 L36CM SXPP1A404

## (undated) DEVICE — COLUMN DRAPE

## (undated) DEVICE — TIP COVER ACCESSORY